# Patient Record
Sex: FEMALE | Race: BLACK OR AFRICAN AMERICAN | NOT HISPANIC OR LATINO | Employment: OTHER | URBAN - METROPOLITAN AREA
[De-identification: names, ages, dates, MRNs, and addresses within clinical notes are randomized per-mention and may not be internally consistent; named-entity substitution may affect disease eponyms.]

---

## 2017-01-05 ENCOUNTER — HISTORICAL (OUTPATIENT)
Dept: LAB | Facility: HOSPITAL | Age: 72
End: 2017-01-05

## 2017-01-06 ENCOUNTER — HISTORICAL (OUTPATIENT)
Dept: ADMINISTRATIVE | Facility: HOSPITAL | Age: 72
End: 2017-01-06

## 2017-09-01 ENCOUNTER — HISTORICAL (OUTPATIENT)
Dept: LAB | Facility: HOSPITAL | Age: 72
End: 2017-09-01

## 2017-09-01 LAB
ABS NEUT (OLG): 4.23 X10(3)/MCL (ref 2.1–9.2)
ALBUMIN SERPL-MCNC: 3.4 GM/DL (ref 3.4–5)
ALBUMIN/GLOB SERPL: 1 RATIO (ref 1.1–2)
ALP SERPL-CCNC: 88 UNIT/L (ref 38–126)
ALT SERPL-CCNC: 27 UNIT/L (ref 12–78)
AST SERPL-CCNC: 17 UNIT/L (ref 15–37)
BASOPHILS # BLD AUTO: 0.1 X10(3)/MCL (ref 0–0.2)
BASOPHILS NFR BLD AUTO: 1 %
BILIRUB SERPL-MCNC: 0.4 MG/DL (ref 0.2–1)
BILIRUBIN DIRECT+TOT PNL SERPL-MCNC: 0
BILIRUBIN DIRECT+TOT PNL SERPL-MCNC: 0.4 MG/DL (ref 0–0.8)
BUN SERPL-MCNC: 14 MG/DL (ref 7–18)
CALCIUM SERPL-MCNC: 9.5 MG/DL (ref 8.5–10.1)
CHLORIDE SERPL-SCNC: 106 MMOL/L (ref 98–107)
CHOLEST SERPL-MCNC: 185 MG/DL (ref 0–200)
CHOLEST/HDLC SERPL: 3.2 {RATIO} (ref 0–4)
CO2 SERPL-SCNC: 29 MMOL/L (ref 21–32)
CREAT SERPL-MCNC: 0.76 MG/DL (ref 0.55–1.02)
CREAT UR-MCNC: 181 MG/DL
EOSINOPHIL # BLD AUTO: 0.2 X10(3)/MCL (ref 0–0.9)
EOSINOPHIL NFR BLD AUTO: 3 %
ERYTHROCYTE [DISTWIDTH] IN BLOOD BY AUTOMATED COUNT: 13.2 % (ref 11.5–17)
EST. AVERAGE GLUCOSE BLD GHB EST-MCNC: 146 MG/DL
GLOBULIN SER-MCNC: 3.4 GM/DL (ref 2.4–3.5)
GLUCOSE SERPL-MCNC: 91 MG/DL (ref 74–106)
HBA1C MFR BLD: 6.7 % (ref 4.2–6.3)
HCT VFR BLD AUTO: 43.8 % (ref 37–47)
HDLC SERPL-MCNC: 57 MG/DL (ref 35–60)
HGB BLD-MCNC: 13.1 GM/DL (ref 12–16)
LDLC SERPL CALC-MCNC: 110 MG/DL (ref 0–129)
LYMPHOCYTES # BLD AUTO: 2 X10(3)/MCL (ref 0.6–4.6)
LYMPHOCYTES NFR BLD AUTO: 28 %
MCH RBC QN AUTO: 29.2 PG (ref 27–31)
MCHC RBC AUTO-ENTMCNC: 29.9 GM/DL (ref 33–36)
MCV RBC AUTO: 97.6 FL (ref 80–94)
MICROALBUMIN UR-MCNC: 1.4 MG/DL
MICROALBUMIN/CREAT RATIO PNL UR: 7.7 MG/GM CR (ref 0–30)
MONOCYTES # BLD AUTO: 0.5 X10(3)/MCL (ref 0.1–1.3)
MONOCYTES NFR BLD AUTO: 8 %
NEUTROPHILS # BLD AUTO: 4.23 X10(3)/MCL (ref 2.1–9.2)
NEUTROPHILS NFR BLD AUTO: 60 %
PLATELET # BLD AUTO: 254 X10(3)/MCL (ref 130–400)
PMV BLD AUTO: 10.1 FL (ref 9.4–12.4)
POTASSIUM SERPL-SCNC: 4.4 MMOL/L (ref 3.5–5.1)
PROT SERPL-MCNC: 6.8 GM/DL (ref 6.4–8.2)
RBC # BLD AUTO: 4.49 X10(6)/MCL (ref 4.2–5.4)
SODIUM SERPL-SCNC: 144 MMOL/L (ref 136–145)
TRIGL SERPL-MCNC: 89 MG/DL (ref 30–150)
VLDLC SERPL CALC-MCNC: 18 MG/DL
WBC # SPEC AUTO: 7.1 X10(3)/MCL (ref 4.5–11.5)

## 2017-10-12 ENCOUNTER — HISTORICAL (OUTPATIENT)
Dept: ADMINISTRATIVE | Facility: HOSPITAL | Age: 72
End: 2017-10-12

## 2017-10-12 LAB
ABS NEUT (OLG): 4.37 X10(3)/MCL (ref 2.1–9.2)
ALBUMIN SERPL-MCNC: 3.7 GM/DL (ref 3.4–5)
ALBUMIN/GLOB SERPL: 1 {RATIO}
ALP SERPL-CCNC: 92 UNIT/L (ref 38–126)
ALT SERPL-CCNC: 26 UNIT/L (ref 12–78)
APPEARANCE, UA: CLEAR
AST SERPL-CCNC: 14 UNIT/L (ref 15–37)
BACTERIA #/AREA URNS AUTO: NORMAL /HPF
BASOPHILS # BLD AUTO: 0 X10(3)/MCL (ref 0–0.2)
BASOPHILS NFR BLD AUTO: 1 %
BILIRUB SERPL-MCNC: 0.3 MG/DL (ref 0.2–1)
BILIRUB UR QL STRIP: NEGATIVE
BILIRUBIN DIRECT+TOT PNL SERPL-MCNC: 0.1 MG/DL (ref 0–0.2)
BILIRUBIN DIRECT+TOT PNL SERPL-MCNC: 0.2 MG/DL (ref 0–0.8)
BUN SERPL-MCNC: 19 MG/DL (ref 7–18)
CALCIUM SERPL-MCNC: 9.6 MG/DL (ref 8.5–10.1)
CHLORIDE SERPL-SCNC: 102 MMOL/L (ref 98–107)
CO2 SERPL-SCNC: 31 MMOL/L (ref 21–32)
COLOR UR: YELLOW
CREAT SERPL-MCNC: 0.78 MG/DL (ref 0.55–1.02)
EOSINOPHIL # BLD AUTO: 0.2 X10(3)/MCL (ref 0–0.9)
EOSINOPHIL NFR BLD AUTO: 3 %
ERYTHROCYTE [DISTWIDTH] IN BLOOD BY AUTOMATED COUNT: 12.9 % (ref 11.5–17)
ERYTHROCYTE [SEDIMENTATION RATE] IN BLOOD: 25 MM/HR (ref 0–20)
GLOBULIN SER-MCNC: 3.6 GM/DL (ref 2.4–3.5)
GLUCOSE (UA): NEGATIVE
GLUCOSE SERPL-MCNC: 99 MG/DL (ref 74–106)
HCT VFR BLD AUTO: 41 % (ref 37–47)
HGB BLD-MCNC: 13.4 GM/DL (ref 12–16)
HGB UR QL STRIP: NEGATIVE
KETONES UR QL STRIP: NEGATIVE
LEUKOCYTE ESTERASE UR QL STRIP: NEGATIVE
LYMPHOCYTES # BLD AUTO: 2.4 X10(3)/MCL (ref 0.6–4.6)
LYMPHOCYTES NFR BLD AUTO: 31 %
MCH RBC QN AUTO: 31.1 PG (ref 27–31)
MCHC RBC AUTO-ENTMCNC: 32.7 GM/DL (ref 33–36)
MCV RBC AUTO: 95.1 FL (ref 80–94)
MONOCYTES # BLD AUTO: 0.8 X10(3)/MCL (ref 0.1–1.3)
MONOCYTES NFR BLD AUTO: 10 %
NEUTROPHILS # BLD AUTO: 4.37 X10(3)/MCL (ref 1.4–7.9)
NEUTROPHILS NFR BLD AUTO: 56 %
NITRITE UR QL STRIP.AUTO: NEGATIVE
PH UR STRIP: 6.5 [PH] (ref 5–9)
PLATELET # BLD AUTO: 265 X10(3)/MCL (ref 130–400)
PMV BLD AUTO: 9.5 FL (ref 9.4–12.4)
POTASSIUM SERPL-SCNC: 3.9 MMOL/L (ref 3.5–5.1)
PROT SERPL-MCNC: 7.3 GM/DL (ref 6.4–8.2)
PROT UR QL STRIP: NEGATIVE
RBC # BLD AUTO: 4.31 X10(6)/MCL (ref 4.2–5.4)
RBC #/AREA URNS HPF: NORMAL /[HPF]
RHEUMATOID FACT SERPL-ACNC: 10 IU/ML (ref 0–15)
SODIUM SERPL-SCNC: 139 MMOL/L (ref 136–145)
SP GR UR STRIP: 1.02 (ref 1–1.03)
SQUAMOUS EPITHELIAL, UA: NORMAL
UROBILINOGEN UR STRIP-ACNC: 0.2
WBC # SPEC AUTO: 7.8 X10(3)/MCL (ref 4.5–11.5)
WBC #/AREA URNS AUTO: NORMAL /HPF (ref 0–3)

## 2018-02-27 ENCOUNTER — HISTORICAL (OUTPATIENT)
Dept: LAB | Facility: HOSPITAL | Age: 73
End: 2018-02-27

## 2018-02-27 LAB
ALBUMIN SERPL-MCNC: 3.6 GM/DL (ref 3.4–5)
ALBUMIN/GLOB SERPL: 0.9 RATIO (ref 1.1–2)
ALP SERPL-CCNC: 92 UNIT/L (ref 38–126)
ALT SERPL-CCNC: 32 UNIT/L (ref 12–78)
APPEARANCE, UA: CLEAR
AST SERPL-CCNC: 22 UNIT/L (ref 15–37)
BACTERIA SPEC CULT: ABNORMAL /HPF
BILIRUB SERPL-MCNC: 0.7 MG/DL (ref 0.2–1)
BILIRUB UR QL STRIP: NEGATIVE
BILIRUBIN DIRECT+TOT PNL SERPL-MCNC: 0.1 MG/DL (ref 0–0.5)
BILIRUBIN DIRECT+TOT PNL SERPL-MCNC: 0.6 MG/DL (ref 0–0.8)
BUN SERPL-MCNC: 12 MG/DL (ref 7–18)
CALCIUM SERPL-MCNC: 9.3 MG/DL (ref 8.5–10.1)
CHLORIDE SERPL-SCNC: 104 MMOL/L (ref 98–107)
CO2 SERPL-SCNC: 30 MMOL/L (ref 21–32)
COLOR UR: YELLOW
CREAT SERPL-MCNC: 0.67 MG/DL (ref 0.55–1.02)
EST. AVERAGE GLUCOSE BLD GHB EST-MCNC: 157 MG/DL
GLOBULIN SER-MCNC: 3.8 GM/DL (ref 2.4–3.5)
GLUCOSE (UA): NEGATIVE
GLUCOSE SERPL-MCNC: 123 MG/DL (ref 74–106)
HBA1C MFR BLD: 7.1 % (ref 4.2–6.3)
HGB UR QL STRIP: NEGATIVE
KETONES UR QL STRIP: NEGATIVE
LEUKOCYTE ESTERASE UR QL STRIP: NEGATIVE
NITRITE UR QL STRIP: NEGATIVE
PH UR STRIP: 8 [PH] (ref 5–9)
POTASSIUM SERPL-SCNC: 4 MMOL/L (ref 3.5–5.1)
PROT SERPL-MCNC: 7.4 GM/DL (ref 6.4–8.2)
PROT UR QL STRIP: ABNORMAL
RBC #/AREA URNS HPF: ABNORMAL /[HPF]
SODIUM SERPL-SCNC: 138 MMOL/L (ref 136–145)
SP GR UR STRIP: 1.02 (ref 1–1.03)
SQUAMOUS EPITHELIAL, UA: ABNORMAL
TSH SERPL-ACNC: 1.29 MIU/ML (ref 0.36–3.74)
UA WBC MAN: ABNORMAL
UROBILINOGEN UR STRIP-ACNC: 0.2

## 2018-03-07 ENCOUNTER — HISTORICAL (OUTPATIENT)
Dept: ENDOSCOPY | Facility: HOSPITAL | Age: 73
End: 2018-03-07

## 2018-03-08 ENCOUNTER — HISTORICAL (OUTPATIENT)
Dept: ENDOSCOPY | Facility: HOSPITAL | Age: 73
End: 2018-03-08

## 2018-04-04 ENCOUNTER — HISTORICAL (OUTPATIENT)
Dept: LAB | Facility: HOSPITAL | Age: 73
End: 2018-04-04

## 2018-07-09 ENCOUNTER — HISTORICAL (OUTPATIENT)
Dept: ADMINISTRATIVE | Facility: HOSPITAL | Age: 73
End: 2018-07-09

## 2018-07-09 LAB
ABS NEUT (OLG): 4.34 X10(3)/MCL (ref 2.1–9.2)
ALBUMIN SERPL-MCNC: 3.6 GM/DL (ref 3.4–5)
ALBUMIN/GLOB SERPL: 0.9 {RATIO}
ALP SERPL-CCNC: 90 UNIT/L (ref 38–126)
ALT SERPL-CCNC: 29 UNIT/L (ref 12–78)
AST SERPL-CCNC: 20 UNIT/L (ref 15–37)
BASOPHILS # BLD AUTO: 0.1 X10(3)/MCL (ref 0–0.2)
BASOPHILS NFR BLD AUTO: 1 %
BILIRUB SERPL-MCNC: 0.4 MG/DL (ref 0.2–1)
BILIRUBIN DIRECT+TOT PNL SERPL-MCNC: 0.1 MG/DL (ref 0–0.2)
BILIRUBIN DIRECT+TOT PNL SERPL-MCNC: 0.3 MG/DL (ref 0–0.8)
BUN SERPL-MCNC: 17 MG/DL (ref 7–18)
CALCIUM SERPL-MCNC: 9.6 MG/DL (ref 8.5–10.1)
CHLORIDE SERPL-SCNC: 101 MMOL/L (ref 98–107)
CHOLEST SERPL-MCNC: 188 MG/DL (ref 0–200)
CHOLEST/HDLC SERPL: 3.5 {RATIO} (ref 0–4)
CO2 SERPL-SCNC: 32 MMOL/L (ref 21–32)
CREAT SERPL-MCNC: 0.76 MG/DL (ref 0.55–1.02)
CREAT UR-MCNC: 145 MG/DL
DEPRECATED CALCIDIOL+CALCIFEROL SERPL-MC: 40 NG/ML (ref 30–80)
EOSINOPHIL # BLD AUTO: 0.2 X10(3)/MCL (ref 0–0.9)
EOSINOPHIL NFR BLD AUTO: 2 %
ERYTHROCYTE [DISTWIDTH] IN BLOOD BY AUTOMATED COUNT: 12.4 % (ref 11.5–17)
EST. AVERAGE GLUCOSE BLD GHB EST-MCNC: 160 MG/DL
GLOBULIN SER-MCNC: 3.8 GM/DL (ref 2.4–3.5)
GLUCOSE SERPL-MCNC: 117 MG/DL (ref 74–106)
HBA1C MFR BLD: 7.2 % (ref 4.2–6.3)
HCT VFR BLD AUTO: 42.4 % (ref 37–47)
HDLC SERPL-MCNC: 54 MG/DL (ref 35–60)
HGB BLD-MCNC: 13.4 GM/DL (ref 12–16)
LDLC SERPL CALC-MCNC: 117 MG/DL (ref 0–129)
LYMPHOCYTES # BLD AUTO: 2.1 X10(3)/MCL (ref 0.6–4.6)
LYMPHOCYTES NFR BLD AUTO: 30 %
MCH RBC QN AUTO: 30.2 PG (ref 27–31)
MCHC RBC AUTO-ENTMCNC: 31.6 GM/DL (ref 33–36)
MCV RBC AUTO: 95.5 FL (ref 80–94)
MICROALBUMIN UR-MCNC: 1.1 MG/DL
MICROALBUMIN/CREAT RATIO PNL UR: 7.6 MG/GM CR (ref 0–30)
MONOCYTES # BLD AUTO: 0.4 X10(3)/MCL (ref 0.1–1.3)
MONOCYTES NFR BLD AUTO: 6 %
NEUTROPHILS # BLD AUTO: 4.34 X10(3)/MCL (ref 2.1–9.2)
NEUTROPHILS NFR BLD AUTO: 61 %
PLATELET # BLD AUTO: 299 X10(3)/MCL (ref 130–400)
PMV BLD AUTO: 10.5 FL (ref 9.4–12.4)
POTASSIUM SERPL-SCNC: 3.8 MMOL/L (ref 3.5–5.1)
PROT SERPL-MCNC: 7.4 GM/DL (ref 6.4–8.2)
RBC # BLD AUTO: 4.44 X10(6)/MCL (ref 4.2–5.4)
SODIUM SERPL-SCNC: 141 MMOL/L (ref 136–145)
TRIGL SERPL-MCNC: 86 MG/DL (ref 30–150)
TSH SERPL-ACNC: 1.26 MIU/L (ref 0.36–3.74)
VLDLC SERPL CALC-MCNC: 17 MG/DL
WBC # SPEC AUTO: 7.2 X10(3)/MCL (ref 4.5–11.5)

## 2019-01-03 ENCOUNTER — HISTORICAL (OUTPATIENT)
Dept: LAB | Facility: HOSPITAL | Age: 74
End: 2019-01-03

## 2019-01-03 LAB
ABS NEUT (OLG): 4.38 X10(3)/MCL (ref 2.1–9.2)
ALBUMIN SERPL-MCNC: 3.5 GM/DL (ref 3.4–5)
ALBUMIN/GLOB SERPL: 1 RATIO (ref 1.1–2)
ALP SERPL-CCNC: 107 UNIT/L (ref 38–126)
ALT SERPL-CCNC: 28 UNIT/L (ref 12–78)
APPEARANCE, UA: CLEAR
AST SERPL-CCNC: 20 UNIT/L (ref 15–37)
BACTERIA SPEC CULT: ABNORMAL /HPF
BASOPHILS # BLD AUTO: 0.1 X10(3)/MCL (ref 0–0.2)
BASOPHILS NFR BLD AUTO: 1 %
BILIRUB SERPL-MCNC: 0.2 MG/DL (ref 0.2–1)
BILIRUB UR QL STRIP: NEGATIVE
BILIRUBIN DIRECT+TOT PNL SERPL-MCNC: 0.1 MG/DL (ref 0–0.5)
BILIRUBIN DIRECT+TOT PNL SERPL-MCNC: 0.1 MG/DL (ref 0–0.8)
BUN SERPL-MCNC: 17 MG/DL (ref 7–18)
CALCIUM SERPL-MCNC: 9 MG/DL (ref 8.5–10.1)
CHLORIDE SERPL-SCNC: 103 MMOL/L (ref 98–107)
CO2 SERPL-SCNC: 30 MMOL/L (ref 21–32)
COLOR UR: YELLOW
CREAT SERPL-MCNC: 0.84 MG/DL (ref 0.55–1.02)
CREAT UR-MCNC: 68 MG/DL
DEPRECATED CALCIDIOL+CALCIFEROL SERPL-MC: 38.8 NG/ML (ref 30–80)
EOSINOPHIL # BLD AUTO: 0.3 X10(3)/MCL (ref 0–0.9)
EOSINOPHIL NFR BLD AUTO: 3 %
ERYTHROCYTE [DISTWIDTH] IN BLOOD BY AUTOMATED COUNT: 12.9 % (ref 11.5–17)
EST. AVERAGE GLUCOSE BLD GHB EST-MCNC: 151 MG/DL
GLOBULIN SER-MCNC: 3.6 GM/DL (ref 2.4–3.5)
GLUCOSE (UA): NEGATIVE
GLUCOSE SERPL-MCNC: 130 MG/DL (ref 74–106)
HBA1C MFR BLD: 6.9 % (ref 4.2–6.3)
HCT VFR BLD AUTO: 43 % (ref 37–47)
HGB BLD-MCNC: 13.1 GM/DL (ref 12–16)
HGB UR QL STRIP: NEGATIVE
KETONES UR QL STRIP: ABNORMAL
LEUKOCYTE ESTERASE UR QL STRIP: ABNORMAL
LYMPHOCYTES # BLD AUTO: 3 X10(3)/MCL (ref 0.6–4.6)
LYMPHOCYTES NFR BLD AUTO: 35 %
MCH RBC QN AUTO: 29.5 PG (ref 27–31)
MCHC RBC AUTO-ENTMCNC: 30.5 GM/DL (ref 33–36)
MCV RBC AUTO: 96.8 FL (ref 80–94)
MICROALBUMIN UR-MCNC: 0.8 MG/DL
MICROALBUMIN/CREAT RATIO PNL UR: 11.8 MG/GM CR (ref 0–30)
MONOCYTES # BLD AUTO: 0.8 X10(3)/MCL (ref 0.1–1.3)
MONOCYTES NFR BLD AUTO: 10 %
NEUTROPHILS # BLD AUTO: 4.38 X10(3)/MCL (ref 2.1–9.2)
NEUTROPHILS NFR BLD AUTO: 51 %
NITRITE UR QL STRIP: NEGATIVE
PH UR STRIP: 8.5 [PH] (ref 5–9)
PLATELET # BLD AUTO: 273 X10(3)/MCL (ref 130–400)
PMV BLD AUTO: 10 FL (ref 9.4–12.4)
POTASSIUM SERPL-SCNC: 4.1 MMOL/L (ref 3.5–5.1)
PROT SERPL-MCNC: 7.1 GM/DL (ref 6.4–8.2)
PROT UR QL STRIP: NEGATIVE
RBC # BLD AUTO: 4.44 X10(6)/MCL (ref 4.2–5.4)
RBC #/AREA URNS HPF: 11 /HPF (ref 0–2)
SODIUM SERPL-SCNC: 140 MMOL/L (ref 136–145)
SP GR UR STRIP: 1.02 (ref 1–1.03)
SQUAMOUS EPITHELIAL, UA: ABNORMAL
TSH SERPL-ACNC: 2.72 MIU/L (ref 0.36–3.74)
UROBILINOGEN UR STRIP-ACNC: 0.2
VIT B12 SERPL-MCNC: 980 PG/ML (ref 193–986)
WBC # SPEC AUTO: 8.6 X10(3)/MCL (ref 4.5–11.5)
WBC #/AREA URNS HPF: ABNORMAL /[HPF]

## 2019-04-23 ENCOUNTER — HISTORICAL (OUTPATIENT)
Dept: ADMINISTRATIVE | Facility: HOSPITAL | Age: 74
End: 2019-04-23

## 2019-05-21 LAB
BUN SERPL-MCNC: 27 MG/DL (ref 7–18)
CALCIUM SERPL-MCNC: 8.9 MG/DL (ref 8.5–10.1)
CHLORIDE SERPL-SCNC: 96 MMOL/L (ref 98–107)
CO2 SERPL-SCNC: 33 MMOL/L (ref 21–32)
CREAT SERPL-MCNC: 1.21 MG/DL (ref 0.55–1.02)
CREAT/UREA NIT SERPL: 22
EST. AVERAGE GLUCOSE BLD GHB EST-MCNC: 160 MG/DL
GLUCOSE SERPL-MCNC: 167 MG/DL (ref 74–106)
HBA1C MFR BLD: 7.2 % (ref 4.2–6.3)
POTASSIUM SERPL-SCNC: 3.2 MMOL/L (ref 3.5–5.1)
SODIUM SERPL-SCNC: 137 MMOL/L (ref 136–145)

## 2019-05-30 ENCOUNTER — HISTORICAL (OUTPATIENT)
Dept: SURGERY | Facility: HOSPITAL | Age: 74
End: 2019-05-30

## 2019-06-05 ENCOUNTER — HISTORICAL (OUTPATIENT)
Dept: ADMINISTRATIVE | Facility: HOSPITAL | Age: 74
End: 2019-06-05

## 2019-06-26 ENCOUNTER — HISTORICAL (OUTPATIENT)
Dept: ADMINISTRATIVE | Facility: HOSPITAL | Age: 74
End: 2019-06-26

## 2019-07-02 ENCOUNTER — HISTORICAL (OUTPATIENT)
Dept: LAB | Facility: HOSPITAL | Age: 74
End: 2019-07-02

## 2019-07-02 LAB
ABS NEUT (OLG): 4.26 X10(3)/MCL (ref 2.1–9.2)
ALBUMIN SERPL-MCNC: 3.5 GM/DL (ref 3.4–5)
ALBUMIN/GLOB SERPL: 1.1 RATIO (ref 1.1–2)
ALP SERPL-CCNC: 74 UNIT/L (ref 38–126)
ALT SERPL-CCNC: 22 UNIT/L (ref 12–78)
AST SERPL-CCNC: 14 UNIT/L (ref 15–37)
BASOPHILS # BLD AUTO: 0 X10(3)/MCL (ref 0–0.2)
BASOPHILS NFR BLD AUTO: 1 %
BILIRUB SERPL-MCNC: 0.3 MG/DL (ref 0.2–1)
BILIRUBIN DIRECT+TOT PNL SERPL-MCNC: 0.1 MG/DL (ref 0–0.5)
BILIRUBIN DIRECT+TOT PNL SERPL-MCNC: 0.2 MG/DL (ref 0–0.8)
BUN SERPL-MCNC: 17 MG/DL (ref 7–18)
CALCIUM SERPL-MCNC: 9.6 MG/DL (ref 8.5–10.1)
CHLORIDE SERPL-SCNC: 105 MMOL/L (ref 98–107)
CHOLEST SERPL-MCNC: 161 MG/DL (ref 0–200)
CHOLEST/HDLC SERPL: 3.5 {RATIO} (ref 0–4)
CO2 SERPL-SCNC: 31 MMOL/L (ref 21–32)
CREAT SERPL-MCNC: 0.86 MG/DL (ref 0.55–1.02)
CREAT UR-MCNC: 202 MG/DL
DEPRECATED CALCIDIOL+CALCIFEROL SERPL-MC: 36 NG/ML (ref 30–80)
EOSINOPHIL # BLD AUTO: 0.2 X10(3)/MCL (ref 0–0.9)
EOSINOPHIL NFR BLD AUTO: 2 %
ERYTHROCYTE [DISTWIDTH] IN BLOOD BY AUTOMATED COUNT: 12.9 % (ref 11.5–17)
EST. AVERAGE GLUCOSE BLD GHB EST-MCNC: 148 MG/DL
GLOBULIN SER-MCNC: 3.2 GM/DL (ref 2.4–3.5)
GLUCOSE SERPL-MCNC: 138 MG/DL (ref 74–106)
HBA1C MFR BLD: 6.8 % (ref 4.2–6.3)
HCT VFR BLD AUTO: 42.8 % (ref 37–47)
HDLC SERPL-MCNC: 46 MG/DL (ref 35–60)
HGB BLD-MCNC: 13.1 GM/DL (ref 12–16)
LDLC SERPL CALC-MCNC: 94 MG/DL (ref 0–129)
LYMPHOCYTES # BLD AUTO: 2.1 X10(3)/MCL (ref 0.6–4.6)
LYMPHOCYTES NFR BLD AUTO: 30 %
MCH RBC QN AUTO: 29.6 PG (ref 27–31)
MCHC RBC AUTO-ENTMCNC: 30.6 GM/DL (ref 33–36)
MCV RBC AUTO: 96.8 FL (ref 80–94)
MICROALBUMIN UR-MCNC: 1.3 MG/DL
MICROALBUMIN/CREAT RATIO PNL UR: 6.4 MG/GM CR (ref 0–30)
MONOCYTES # BLD AUTO: 0.5 X10(3)/MCL (ref 0.1–1.3)
MONOCYTES NFR BLD AUTO: 8 %
NEUTROPHILS # BLD AUTO: 4.26 X10(3)/MCL (ref 2.1–9.2)
NEUTROPHILS NFR BLD AUTO: 60 %
PLATELET # BLD AUTO: 301 X10(3)/MCL (ref 130–400)
PMV BLD AUTO: 11.1 FL (ref 9.4–12.4)
POTASSIUM SERPL-SCNC: 4 MMOL/L (ref 3.5–5.1)
PROT SERPL-MCNC: 6.7 GM/DL (ref 6.4–8.2)
RBC # BLD AUTO: 4.42 X10(6)/MCL (ref 4.2–5.4)
SODIUM SERPL-SCNC: 141 MMOL/L (ref 136–145)
TRIGL SERPL-MCNC: 107 MG/DL (ref 30–150)
TSH SERPL-ACNC: 1.12 MIU/L (ref 0.36–3.74)
VLDLC SERPL CALC-MCNC: 21 MG/DL
WBC # SPEC AUTO: 7.1 X10(3)/MCL (ref 4.5–11.5)

## 2019-07-18 ENCOUNTER — HISTORICAL (OUTPATIENT)
Dept: ADMINISTRATIVE | Facility: HOSPITAL | Age: 74
End: 2019-07-18

## 2019-10-18 ENCOUNTER — HISTORICAL (OUTPATIENT)
Dept: ADMINISTRATIVE | Facility: HOSPITAL | Age: 74
End: 2019-10-18

## 2019-11-20 ENCOUNTER — HISTORICAL (OUTPATIENT)
Dept: ADMINISTRATIVE | Facility: HOSPITAL | Age: 74
End: 2019-11-20

## 2020-02-27 ENCOUNTER — HISTORICAL (OUTPATIENT)
Dept: RADIOLOGY | Facility: HOSPITAL | Age: 75
End: 2020-02-27

## 2020-08-03 ENCOUNTER — HISTORICAL (OUTPATIENT)
Dept: ADMINISTRATIVE | Facility: HOSPITAL | Age: 75
End: 2020-08-03

## 2020-08-03 LAB
ABS NEUT (OLG): 4.55 X10(3)/MCL (ref 2.1–9.2)
ALBUMIN SERPL-MCNC: 3.7 GM/DL (ref 3.4–4.8)
ALBUMIN/GLOB SERPL: 1.1 RATIO (ref 1.1–2)
ALP SERPL-CCNC: 82 UNIT/L (ref 40–150)
ALT SERPL-CCNC: 25 UNIT/L (ref 0–55)
AST SERPL-CCNC: 16 UNIT/L (ref 5–34)
BASOPHILS # BLD AUTO: 0.1 X10(3)/MCL (ref 0–0.2)
BASOPHILS NFR BLD AUTO: 1 %
BILIRUB SERPL-MCNC: 0.3 MG/DL
BILIRUBIN DIRECT+TOT PNL SERPL-MCNC: 0.1 MG/DL (ref 0–0.5)
BILIRUBIN DIRECT+TOT PNL SERPL-MCNC: 0.2 MG/DL (ref 0–0.8)
BUN SERPL-MCNC: 17.4 MG/DL (ref 9.8–20.1)
CALCIUM SERPL-MCNC: 9.7 MG/DL (ref 8.4–10.2)
CHLORIDE SERPL-SCNC: 98 MMOL/L (ref 98–107)
CHOLEST SERPL-MCNC: 194 MG/DL
CHOLEST/HDLC SERPL: 5 {RATIO} (ref 0–5)
CO2 SERPL-SCNC: 33 MMOL/L (ref 23–31)
CREAT SERPL-MCNC: 0.94 MG/DL (ref 0.55–1.02)
DEPRECATED CALCIDIOL+CALCIFEROL SERPL-MC: 73.7 NG/ML (ref 6.6–49.9)
EOSINOPHIL # BLD AUTO: 0.2 X10(3)/MCL (ref 0–0.9)
EOSINOPHIL NFR BLD AUTO: 2 %
ERYTHROCYTE [DISTWIDTH] IN BLOOD BY AUTOMATED COUNT: 12.3 % (ref 11.5–17)
EST. AVERAGE GLUCOSE BLD GHB EST-MCNC: 168.6 MG/DL
GLOBULIN SER-MCNC: 3.3 GM/DL (ref 2.4–3.5)
GLUCOSE SERPL-MCNC: 160 MG/DL (ref 82–115)
HBA1C MFR BLD: 7.5 %
HCT VFR BLD AUTO: 42.8 % (ref 37–47)
HDLC SERPL-MCNC: 43 MG/DL (ref 35–60)
HGB BLD-MCNC: 13 GM/DL (ref 12–16)
LDLC SERPL CALC-MCNC: 121 MG/DL (ref 50–140)
LYMPHOCYTES # BLD AUTO: 2.3 X10(3)/MCL (ref 0.6–4.6)
LYMPHOCYTES NFR BLD AUTO: 30 %
MCH RBC QN AUTO: 29.8 PG (ref 27–31)
MCHC RBC AUTO-ENTMCNC: 30.4 GM/DL (ref 33–36)
MCV RBC AUTO: 98.2 FL (ref 80–94)
MONOCYTES # BLD AUTO: 0.5 X10(3)/MCL (ref 0.1–1.3)
MONOCYTES NFR BLD AUTO: 7 %
NEUTROPHILS # BLD AUTO: 4.55 X10(3)/MCL (ref 2.1–9.2)
NEUTROPHILS NFR BLD AUTO: 60 %
PLATELET # BLD AUTO: 288 X10(3)/MCL (ref 130–400)
PMV BLD AUTO: 11 FL (ref 9.4–12.4)
POTASSIUM SERPL-SCNC: 4.5 MMOL/L (ref 3.5–5.1)
PROT SERPL-MCNC: 7 GM/DL (ref 5.8–7.6)
RBC # BLD AUTO: 4.36 X10(6)/MCL (ref 4.2–5.4)
SODIUM SERPL-SCNC: 139 MMOL/L (ref 136–145)
TRIGL SERPL-MCNC: 150 MG/DL (ref 37–140)
TSH SERPL-ACNC: 2.58 UIU/ML (ref 0.35–4.94)
VLDLC SERPL CALC-MCNC: 30 MG/DL
WBC # SPEC AUTO: 7.6 X10(3)/MCL (ref 4.5–11.5)

## 2020-08-06 ENCOUNTER — HISTORICAL (OUTPATIENT)
Dept: ADMINISTRATIVE | Facility: HOSPITAL | Age: 75
End: 2020-08-06

## 2020-08-06 LAB
CREAT UR-MCNC: 36.1 MG/DL (ref 45–106)
MICROALBUMIN UR-MCNC: <5 UG/ML
MICROALBUMIN/CREAT RATIO PNL UR: <13.9 MG/GM CR (ref 0–30)

## 2020-08-25 ENCOUNTER — HISTORICAL (OUTPATIENT)
Dept: ADMINISTRATIVE | Facility: HOSPITAL | Age: 75
End: 2020-08-25

## 2020-09-15 ENCOUNTER — HISTORICAL (OUTPATIENT)
Dept: ADMINISTRATIVE | Facility: HOSPITAL | Age: 75
End: 2020-09-15

## 2021-03-23 LAB — CRC RECOMMENDATION EXT: NORMAL

## 2021-09-08 LAB — BCS RECOMMENDATION EXT: NORMAL

## 2021-10-11 ENCOUNTER — HISTORICAL (OUTPATIENT)
Dept: ADMINISTRATIVE | Facility: HOSPITAL | Age: 76
End: 2021-10-11

## 2021-10-11 LAB
ABS NEUT (OLG): 4.38 X10(3)/MCL (ref 2.1–9.2)
ALBUMIN SERPL-MCNC: 3.5 GM/DL (ref 3.4–4.8)
ALBUMIN/GLOB SERPL: 1.1 RATIO (ref 1.1–2)
ALP SERPL-CCNC: 98 UNIT/L (ref 40–150)
ALT SERPL-CCNC: 32 UNIT/L (ref 0–55)
AST SERPL-CCNC: 22 UNIT/L (ref 5–34)
BASOPHILS # BLD AUTO: 0.1 X10(3)/MCL (ref 0–0.2)
BASOPHILS NFR BLD AUTO: 1 %
BILIRUB SERPL-MCNC: 0.3 MG/DL
BILIRUBIN DIRECT+TOT PNL SERPL-MCNC: 0.1 MG/DL (ref 0–0.5)
BILIRUBIN DIRECT+TOT PNL SERPL-MCNC: 0.2 MG/DL (ref 0–0.8)
BUN SERPL-MCNC: 16.4 MG/DL (ref 9.8–20.1)
CALCIUM SERPL-MCNC: 9.8 MG/DL (ref 8.4–10.2)
CHLORIDE SERPL-SCNC: 102 MMOL/L (ref 98–107)
CHOLEST SERPL-MCNC: 165 MG/DL
CHOLEST/HDLC SERPL: 5 {RATIO} (ref 0–5)
CO2 SERPL-SCNC: 31 MMOL/L (ref 23–31)
CREAT SERPL-MCNC: 0.99 MG/DL (ref 0.55–1.02)
CREAT UR-MCNC: 83.5 MG/DL (ref 45–106)
DEPRECATED CALCIDIOL+CALCIFEROL SERPL-MC: 44.7 NG/ML (ref 30–80)
EOSINOPHIL # BLD AUTO: 0.1 X10(3)/MCL (ref 0–0.9)
EOSINOPHIL NFR BLD AUTO: 2 %
ERYTHROCYTE [DISTWIDTH] IN BLOOD BY AUTOMATED COUNT: 12.4 % (ref 11.5–17)
EST. AVERAGE GLUCOSE BLD GHB EST-MCNC: 248.9 MG/DL
GLOBULIN SER-MCNC: 3.3 GM/DL (ref 2.4–3.5)
GLUCOSE SERPL-MCNC: 324 MG/DL (ref 82–115)
HBA1C MFR BLD: 10.3 %
HCT VFR BLD AUTO: 43 % (ref 37–47)
HDLC SERPL-MCNC: 35 MG/DL (ref 35–60)
HGB BLD-MCNC: 13.1 GM/DL (ref 12–16)
LDLC SERPL CALC-MCNC: 94 MG/DL (ref 50–140)
LYMPHOCYTES # BLD AUTO: 2.5 X10(3)/MCL (ref 0.6–4.6)
LYMPHOCYTES NFR BLD AUTO: 33 %
MCH RBC QN AUTO: 29.4 PG (ref 27–31)
MCHC RBC AUTO-ENTMCNC: 30.5 GM/DL (ref 33–36)
MCV RBC AUTO: 96.6 FL (ref 80–94)
MICROALBUMIN UR-MCNC: 6.2 UG/ML
MICROALBUMIN/CREAT RATIO PNL UR: 7.4 MG/GM CR (ref 0–30)
MONOCYTES # BLD AUTO: 0.5 X10(3)/MCL (ref 0.1–1.3)
MONOCYTES NFR BLD AUTO: 7 %
NEUTROPHILS # BLD AUTO: 4.38 X10(3)/MCL (ref 2.1–9.2)
NEUTROPHILS NFR BLD AUTO: 57 %
PLATELET # BLD AUTO: 309 X10(3)/MCL (ref 130–400)
PMV BLD AUTO: 10.9 FL (ref 9.4–12.4)
POTASSIUM SERPL-SCNC: 4.4 MMOL/L (ref 3.5–5.1)
PROT SERPL-MCNC: 6.8 GM/DL (ref 5.8–7.6)
RBC # BLD AUTO: 4.45 X10(6)/MCL (ref 4.2–5.4)
SODIUM SERPL-SCNC: 140 MMOL/L (ref 136–145)
TRIGL SERPL-MCNC: 180 MG/DL (ref 37–140)
TSH SERPL-ACNC: 1.17 UIU/ML (ref 0.35–4.94)
VLDLC SERPL CALC-MCNC: 36 MG/DL
WBC # SPEC AUTO: 7.6 X10(3)/MCL (ref 4.5–11.5)

## 2022-01-02 ENCOUNTER — NURSE TRIAGE (OUTPATIENT)
Dept: ADMINISTRATIVE | Facility: CLINIC | Age: 77
End: 2022-01-02

## 2022-01-02 NOTE — TELEPHONE ENCOUNTER
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 Wants to know when she should get tested after exposure. Reviewed CDC guidelines.     https://www.cdc.gov/media/releases/2021/s1227-isolation-quarantine-guidance.html    Reason for Disposition   General information question, no triage required and triager able to answer question    Protocols used: INFORMATION ONLY CALL - NO TRIAGE-A-

## 2022-04-10 ENCOUNTER — HISTORICAL (OUTPATIENT)
Dept: ADMINISTRATIVE | Facility: HOSPITAL | Age: 77
End: 2022-04-10
Payer: MEDICARE

## 2022-04-19 ENCOUNTER — HISTORICAL (OUTPATIENT)
Dept: ADMINISTRATIVE | Facility: HOSPITAL | Age: 77
End: 2022-04-19
Payer: MEDICARE

## 2022-04-19 LAB
EST. AVERAGE GLUCOSE BLD GHB EST-MCNC: 185.8 MG/DL
HBA1C MFR BLD: 8.1 %

## 2022-04-27 VITALS
BODY MASS INDEX: 43.28 KG/M2 | OXYGEN SATURATION: 98 % | DIASTOLIC BLOOD PRESSURE: 78 MMHG | HEIGHT: 61 IN | SYSTOLIC BLOOD PRESSURE: 132 MMHG | WEIGHT: 229.25 LBS

## 2022-04-30 NOTE — H&P
Patient:   Emily Oshea            MRN: [id number]            FIN: [id number]               Age:   72 years     Sex:  Female     :  1945   Associated Diagnoses:   None   Author:   Darion Mariee MD      Basic Information   Source of history:  Self, Medical record.       Chief Complaint   72 year old female referred for colon polyp surveillance.  She recently had egd to evaluate   GERD she was found to have some esophagitis and duodenal erosions thought to contribute her pain but no source of the black stool.  Of note she did go to the ER for pain and black stool and her blood counts were normal and her stool for blood was negative.  Her plan for colonoscopy today shows occasional constipation but no diarrhea or gross bleeding.  She does describe again the black stools.  She had a polypscreenin.  Plan for colonoscopy rectal follow      Review of Systems   Constitutional:  Negative.    Ear/Nose/Mouth/Throat:  Negative.    Respiratory:  Negative.    Cardiovascular:  Negative.    Gastrointestinal:  Constipation, History of polyps.       Health Status   Allergies:    Allergic Reactions (Selected)  Severity Not Documented  Lyrica- Fatigue.,    Allergies (1) Active Reaction  Lyrica fatigue     Current medications:  (Selected)   Inpatient Medications  Ordered  Toradol for IM: 60 mg, form: Injection, IM, Once, first dose 13 21:20:00 CST, stop date 13 21:20:00 CST, STAT  Pending Complete  Magnesium Sulfate 1gm/100ml IVPB (Premix): 1 gm, form: Infusion, IV Piggyback, q1hr, Infuse over: 1 hr, Order duration: 2 dose(s), first dose 17 13:00:00 CST, stop date 17 14:59:00 CST, [id number]  Prescriptions  Prescribed  carvedilol 12.5 mg oral tablet: See Instructions, TAKE 1 TABLET BY MOUTH TWO  TIMES DAILY, # 180 tab(s), eRx: [facility]  clopidogrel 75 mg oral tablet: See Instructions, TAKE 1 TABLET BY MOUTH  DAILY, # 90 tab(s), eRx: [facility]  dicyclomine 20 mg  "oral tablet: 20 mg = 1 tab(s), Oral, QID, # 40 tab(s), 0 Refill(s), Pharmacy: [id number]  furosemide 20 mg oral tablet: See Instructions, TAKE 1 TABLET BY MOUTH  DAILY, # 90 tab(s), eRx: [facility]  metformin 500 mg oral tablet: See Instructions, TAKE 1 TABLET BY MOUTH in am and 2 im pm, # 180 tab(s), eRx: [facility]  ranitidine 150 mg oral tablet: See Instructions, TAKE 1 TABLET BY MOUTH TWO  TIMES DAILY, # 180 tab(s), eRx: [facility]  rosuvastatin 20 mg oral tablet: See Instructions, TAKE 1 TABLET BY MOUTH AT  BEDTIME, # 90 tab(s), eRx: [facility]  Documented Medications  Documented  Alpha Lipoic: 100 mg, Oral, BID, 200mg, 0 Refill(s)  Aspir 81 oral delayed release tablet: 81 mg = 1 tab(s), Oral, Daily, # 30 tab(s), 0 Refill(s)  POTASSIUM CHLORIDE ER 20 MEQ TBCR: 20 mEq = 1 tab(s), Oral, Daily  RIVASTIGMINE TARTRATE 4.5 MG CAPS: 4.5 mg = 1 cap(s), Oral, BID  TAMSULOSIN 0.4MG CAPSULES: 0.4 mg = 1 cap(s), Oral, Daily  Vitamin B-12: 1,000 mcg, Oral, Daily, 0 Refill(s)  Vitamin D3 5000 intl units oral capsule: = 1 tab(s), Oral, Daily, 0 Refill(s)  amLODIPine 10 mg oral tablet: 10 mg = 1 tab(s), Oral, Daily, 0 Refill(s)  hydrochlorothiazide-losartan 12.5 mg-100 mg oral tablet: 1 tab(s), Oral, Daily, # 30 tab(s), 0 Refill(s)   Problem list:    All Problems  Anxiety / SNOMED CT [id number] / Confirmed  Arthritis / SNOMED CT 15VG8252-6H6S-81W9-3A9X-HGF4I551X400 / Confirmed  in knees and back. uses cane and wheelchair  Bladder incontinence / SNOMED CT 4134231798 / Confirmed  "can't empty bladder"  Blockage of coronary artery of heart / SNOMED CT 481229P6-E32L-0J46-[id number] / Confirmed  Cardiac disease / SNOMED CT 20V4M94G-416F-[id number]-Z4N06HF686H9 / Confirmed  Dr. Onofre. last visit 7/10/2013. last EKG 7/13 at [facility]  Cataracts / SNOMED CT 5814104360 / Confirmed  "early"  DDD (degenerative disc disease), cervical / SNOMED CT " 195822223 / Confirmed  Depression / SNOMED CT 611172160 / Confirmed  DIABETES MELLITUS / ICD-9- / Confirmed  Gastro-esophageal reflux disease without esophagitis / SNOMED CT 1686223425 / Confirmed  Glasses / SNOMED CT 7792016349 / Confirmed  High cholesterol / SNOMED CT [id number]-63U4-8408-[id number]-1D07Y0352S36 / Confirmed  Hypertension / SNOMED CT 77141482 / Confirmed  Hypertension / SNOMED CT PJ41C2X8--Q2V9-S7WC6OB87U86 / Confirmed  Memory loss / SNOMED CT 22331489 / Confirmed  waiting on test results from Dr Mcmullen for dementia  Neuropathy / SNOMED CT 1356737660 / Confirmed  NIDDM / SNOMED CT 635501029 / Confirmed  no meds X 3 months  Numbness and tingling in hands / SNOMED CT 918HKCC2-OSWL-3430-[id number] / Confirmed  Obesity / SNOMED CT Y8979A29-7403-8K33-F66C-X8J7491T1H1U / Confirmed  Chronic venous insufficiency / SNOMED CT 43148081 / Confirmed  Reflux / SNOMED CT QH5A3Q71-834O-3E04-T555-4C0V27836IT2 / Confirmed  Shortness of breath / SNOMED CT 702489174 / Confirmed  MCKEE in high elevation areas  Thyroid nodule / SNOMED CT 097602360 / Confirmed  Unsteady gait / SNOMED CT N2282Z86-PA61-4912-GHMK-FT5NQBWCCK21 / Confirmed  Vitamin D deficiency, unspecified / SNOMED CT 85822756 / Confirmed  Weakness generalized / SNOMED CT 6GLX6TV5-UU60-4GV8-DQ73-0238C959S2B3 / Confirmed,    Active Problems (26)  Anxiety   Arthritis   Bladder incontinence   Blockage of coronary artery of heart   Cardiac disease   Cataracts   Chronic venous insufficiency   DDD (degenerative disc disease), cervical   Depression   DIABETES MELLITUS   Gastro-esophageal reflux disease without esophagitis   Glasses   High cholesterol   Hypertension   Hypertension   Memory loss   Neuropathy   NIDDM   Numbness and tingling in hands   Obesity   Reflux   Shortness of breath   Thyroid nodule   Unsteady gait   Vitamin D deficiency, unspecified   Weakness generalized         Histories   Past Medical History:    Active  Glasses  "([id number])  Cataracts (6326685284)  Comments:  7/18/2013 CDT 23:17 CDT - Dinah HAWKINS, Saurabh Gan  "early"  Hypertension (DB10O8S7--E3I6-Y9MS8MG52J54)  High cholesterol (SN8FH0DW-83X7-6473-PY1X-8F24V3831O54)  Cardiac disease (26S1M41L-450E-6Y60-22JF-L4C82GV453P0)  Comments:  7/18/2013 CDT 23:18 CDT - Dinah HAWKINS, Saurabh Gan  Dr. Onofre. last visit 7/10/2013. last EKG 7/13 at [facility]  Blockage of coronary artery of heart (887720C4-L63V-9L72-W7J7-978RDE85983P)  Shortness of breath (729418409)  Comments:  7/18/2013 CDT 23:19 WENDY Nix RN, Saurabh Gan  MCKEE in high elevation areas  Reflux (LQ7E4T53-011Y-0D03-Z218-2Q1K19259RT7)  Obesity (L7250J20-1514-0T98-D17E-N7Q2473B8D3Q)  Bladder incontinence (0147485318)  Comments:  7/18/2013 CDT 23:20 WENDY Nix RN, Saurabh Gan  "can't empty bladder"  Arthritis (39AH1437-9T8M-88A2-7U6T-RXF3J098V865)  Comments:  7/18/2013 CDT 23:21 WENDY Nix RN, Saurabh Gan  in knees and back. uses cane and wheelchair  Unsteady gait (N1679J27-CI73-9333-IGWM-YZ0JBGVLDN56)  Weakness generalized (9PZI5GQ6-GA81-5VL9-PH69-1779A331L3I2)  Numbness and tingling in hands (926CMML0-KQCK-4258-QR72-3E0232865CEQ)  Neuropathy (9543566116)  Memory loss (75912164)  Comments:  7/18/2013 CDT 23:23 CDT - Dinah HAWKINS, Saurabh Gan  waiting on test results from Dr Mcmullen for dementia  NIDDM (162498574)  Comments:  7/25/2013 CDT 15:25 CDT - Rola HAWKINS , Aury OBRIEN  no meds X 3 months  Anxiety (SA55Z793-3D30-0E66-9405-M8648E380OK6)  Depression (854877746)  Resolved  has used steroids in last year (780771458):  Resolved.  Comments:  7/18/2013 CDT 23:24 WENDY - Dinah HAWKINS, Saurabh Gan  injection left knee 2012   Family History:    Cardiac arrest.  Brother     Procedure history:    Biopsy Gastrointestinal on 3/7/2018 at 72 Years.  Comments:  3/7/2018 09:32 - Chuckie HAWKINS, Meli BOOTH  auto-populated from documented surgical case  Esophagogastroduodenoscopy on 3/7/2018 at 72 " Years.  Comments:  3/7/2018 09:32 - Chuckie HAWKINS, Meli BOOTH  auto-populated from documented surgical case  angiogram on 11/15/2016 at 71 Years.  Injection Lumbar Epidural Steroid (., Back) on 1/9/2014 at 68 Years.  Comments:  1/14/2014 10:38 - Pelon ARANDA, Trudi Coelho  auto-populated from documented surgical case  Hysterectomy ([id number]).  Comments:  7/18/2013 23:29 - Saurabh Nix RN  1998  Carpal tunnel release (967524216).  Comments:  7/18/2013 23:30 - Saurabh Nix RN  left hand  rooster cone injections X2 right knee.   Social History        Social & Psychosocial Habits    Alcohol  07/18/2013 Risk Assessment: Denies Alcohol Use    11/05/2015  Use: Never    Employment/School  07/18/2013  Status: Retired    Highest education: High school    11/05/2015  Status: Employed    Exercise  11/05/2015  Duration (average number of minutes): 0    Home/Environment  11/05/2015  Lives with: Alone    01/05/2017  Lives with: Children    Living situation: Home with assistance    Nutrition/Health  11/05/2015  Diet description: Regular Diet    Type of diet: Regular    Sexual  11/05/2015  Sexually active: No    Substance Abuse  07/18/2013 Risk Assessment: Denies Substance Abuse    11/05/2015  Use: Never    Tobacco  01/20/2013 Risk Assessment: Denies Tobacco Use    11/05/2015  Use: Never smoker  .        Physical Examination   Respiratory:  Lungs are clear to auscultation.    Cardiovascular:  Normal rate, Regular rhythm.    Gastrointestinal:  Soft, Non-tender, Non-distended.       No qualifying data available   Measurements from flowsheet : Measurements   3/7/2018 7:00 CST        Weight Dosing             104.33 kg                             Weight Measured and Calculated in Lbs     230.01 lb                             Weight Estimated          104.33 kg                             Height/Length Dosing      162.56 cm                             Height/Length Estimated   162.56 cm                              BSA Estimated             2.17 m2                             Body Mass Index Estimated 39.48 kg/m2        Health Maintenance      Health Maintenance     Pending (in the next year)        OverDue           Aspirin Therapy for CVD Prevention due  12/21/17  and every 1  year(s)        Due            Alcohol Misuse Screening due  02/15/18  and every 1  year(s)           Tetanus Vaccine due  03/08/18  and every 10  year(s)        Refused            Influenza Vaccine due  10/02/17  and every 1  year(s)        Due In Future            Breast Cancer Screening not due until  03/28/18  and every 1  year(s)           Bone Density Screening not due until  05/26/18  and every 2  year(s)           Diabetes Maintenance-HgbA1c not due until  08/27/18  and every 6  month(s)           Hypertension Management-Education not due until  08/31/18  and every 1  year(s)           Smoking Cessation not due until  08/31/18  and every 1  year(s)           Body Mass Index Check not due until  08/31/18  and every 1  year(s)           Obesity Screening not due until  08/31/18  and every 1  year(s)           Diabetes Maintenance-Foot Exam not due until  08/31/18  and every 1  year(s)           Diabetes Maintenance-Fasting Lipid Profile not due until  09/01/18  and every 1  year(s)           Diabetes Maintenance-Microalbumin not due until  09/01/18  and every 1  year(s)           Lipid Screening not due until  09/01/18  and every 1  year(s)           Hypertension Management-Blood Pressure not due until  09/07/18  and every 6  month(s)           Diabetes Maintenance-Eye Exam not due until  11/07/18  and every 1  year(s)           Diabetes Maintenance-Serum Creatinine not due until  02/27/19  and every 1  year(s)           Diabetes Maintenance-Urine Dipstick not due until  02/27/19  and every 1  year(s)           Hypertension Management-BMP not due until  02/27/19  and every 1  year(s)           Depression Screening not due until  02/27/19  and  every 1  year(s)           Blood Pressure Screening not due until  03/07/19  and every 1  year(s)     Satisfied (in the past 1 year)        Satisfied            Blood Pressure Screening on  03/07/18.  Satisfied by Vilma Villa LPN           Body Mass Index Check on  08/31/17.  Satisfied by Adriana Inman           Breast Cancer Screening on  03/28/17.  Satisfied by Maite Gregorio           Colorectal Screening on  02/14/18.  Satisfied by Jigna Bryant           Depression Screening on  02/27/18.  Satisfied by Fay Cisneros           Diabetes Maintenance-Eye Exam on  11/07/17.  Satisfied by Eva Salamanca MD           Diabetes Maintenance-Fasting Lipid Profile on  09/01/17.  Satisfied by Emily Zayas           Diabetes Maintenance-Foot Exam on  08/31/17.  Satisfied by Michelle Segundo MD           Diabetes Maintenance-HgbA1c on  02/27/18.  Satisfied by Michelle Segundo MD           Diabetes Maintenance-Microalbumin on  09/01/17.  Satisfied by Emily Zayas           Diabetes Maintenance-Serum Creatinine on  02/27/18.  Satisfied by Ankita Byrd           Diabetes Maintenance-Urine Dipstick on  02/27/18.  Satisfied by Prabha Gregorio           Hypertension Management-Blood Pressure on  03/07/18.  Satisfied by Vilma Villa LPN           Lipid Screening on  09/01/17.  Satisfied by Emily Zayas           Obesity Screening on  08/31/17.  Satisfied by Adriana Inman           Smoking Cessation on  08/31/17.  Satisfied by Adriana Inman  Reason: Expectation Satisfied Elsewhere          Review / Management   Results review:     No qualifying data available.       Impression and Plan   Plan for colon with recs to follow.  I do not feel strongly about doing an m2a for the dark stools given her cbc is normal and her stool for blood is negative

## 2022-04-30 NOTE — OP NOTE
DATE OF SURGERY:    05/30/2019    SURGEON:  SUSAN Sylvester MD    PREOPERATIVE DIAGNOSIS:  Mallet finger, left ring finger.    POSTOPERATIVE DIAGNOSIS:  Mallet finger, left ring finger.    OPERATIVE PROCEDURE:  Mallet finger repair, left ring finger.    INDICATION FOR OPERATION:  This 73-year-old had a mallet finger that she had developed approximately 4 months ago.  The patient wanted an attempt at repair, but she understood that she may require a fusion if repair was impossible.  The risks and benefits of the proposed and alternative treatment were explained to the patient.  Questions were solicited and answered.  No assurance given.  Informed consent was obtained.    OPERATION IN DETAIL:  After appropriate operative consents were obtained, the patient was taken to the operating room, placed the operating table in supine position.  Axillary block was induced by Anesthesia without difficulty.  The skin on the left arm was prepped and draped in a sterile manner using ChloraPrep.  After exsanguination with an Esmarch bandage, a previously placed arm tourniquet was elevated.  A lazy-S incision was made over the DIP joint of the left ring finger.  Careful dissection was done.  The extensor tendon complex was in continuity, but was stretched out and had partially healed on its own, so I elected to do a secondary repair using a pants-over-vest technique.  An incision was made across the extensor tendon complex and subperiosteal dissection was done to elevate the flaps.  Next, the finger was brought out to full extension and was pinned with a 0.035 K-wire.  This entire process was watched on both AP and lateral planes on image intensification.  Next, using a full-thickness repair technique, the extensor tendon was repaired with the skin with interrupted nylon sutures.  The pin was then cut and bent.  Intraoperative x-rays with the C-arm showed full extension of the finger and good placement of the pin.   The wound was then dressed in a sterile fashion using a nonadherent dressing and then a finger splint was applied.  Sterile dressings were applied to this entire area.       The tourniquet was then deflated.  Lap count, sponge count correct x2.  Estimated blood loss was minimal.  The patient tolerated the procedure without difficulty and was transferred to the  recovery room in stable condition.        ______________________________  M. MD TING Frank/NINA  DD:  05/30/2019  Time:  12:46PM  DT:  05/30/2019  Time:  01:00PM  Job #:  [id number]

## 2022-04-30 NOTE — H&P
Patient:   Emily Oshea            MRN: [id number]            FIN: [id number]               Age:   72 years     Sex:  Female     :  1945   Associated Diagnoses:   None   Author:   Darion Mariee MD      Basic Information   Source of history:  Self, Medical record.       Chief Complaint   72-year-old patient of mine whose recently evaluated in the D EGD at [facility] feelings of near syncopal, weakness, lightheadedness, epigastric abdominal pain associated with nausea vomiting.  She also states she had been having some dark stool at that time.  Sounds like she was evaluated in the ED, abnormal CMP, cardiac enzymes and CBC in a tested positive for the flu.  She underwent to the hospital on 2 separate occasions.  She denies any hematemesis.  She does have a history of GERD and was given stomach medication in pain medications and today reports she is feeling much better.  She also states during that time she had some symptoms of bright red blood in her rectum and is also scheduled for colonoscopy tomorrow.  She has a history of polyps and was recommended for 5 year recall her last colonoscopy was in    she also had an upper endoscopy at time   She has been off her plavix since monday      Review of Systems   Constitutional:  Weakness, Fatigue, Decreased activity.    Eye:  Negative.    Respiratory:  Negative.    Cardiovascular:  Syncope.    Gastrointestinal:  Nausea, Vomiting, Heartburn.         Abdominal pain: Epigastric area, The pain is mild.       Health Status   Allergies:    Allergic Reactions (Selected)  Severity Not Documented  Lyrica- Fatigue.,    Allergies (1) Active Reaction  Lyrica fatigue     Current medications:  (Selected)   Inpatient Medications  Ordered  Toradol for IM: 60 mg, form: Injection, IM, Once, first dose 13 21:20:00 CST, stop date 13 21:20:00 CST, STAT  Pending Complete  Magnesium Sulfate 1gm/100ml IVPB (Premix): 1 gm, form: Infusion, IV Piggyback, q1hr,  Infuse over: 1 hr, Order duration: 2 dose(s), first dose 01/06/17 13:00:00 CST, stop date 01/06/17 14:59:00 CST, 212[address]  Prescriptions  Prescribed  carvedilol 12.5 mg oral tablet: See Instructions, TAKE 1 TABLET BY MOUTH TWO  TIMES DAILY, # 180 tab(s), eRx: [facility]  clopidogrel 75 mg oral tablet: See Instructions, TAKE 1 TABLET BY MOUTH  DAILY, # 90 tab(s), eRx: [facility]  dicyclomine 20 mg oral tablet: 20 mg = 1 tab(s), Oral, QID, # 40 tab(s), 0 Refill(s), Pharmacy: [facility] [id number]  furosemide 20 mg oral tablet: See Instructions, TAKE 1 TABLET BY MOUTH  DAILY, # 90 tab(s), eRx: [facility]  metformin 500 mg oral tablet: See Instructions, TAKE 1 TABLET BY MOUTH in am and 2 im pm, # 180 tab(s), eRx: [facility]  ranitidine 150 mg oral tablet: See Instructions, TAKE 1 TABLET BY MOUTH TWO  TIMES DAILY, # 180 tab(s), eRx: [facility]  rosuvastatin 20 mg oral tablet: See Instructions, TAKE 1 TABLET BY MOUTH AT  BEDTIME, # 90 tab(s), eRx: [facility]  Documented Medications  Documented  Alpha Lipoic: 100 mg, Oral, BID, 200mg, 0 Refill(s)  Aspir 81 oral delayed release tablet: 81 mg = 1 tab(s), Oral, Daily, # 30 tab(s), 0 Refill(s)  POTASSIUM CHLORIDE ER 20 MEQ TBCR: 20 mEq = 1 tab(s), Oral, Daily  RIVASTIGMINE TARTRATE 4.5 MG CAPS: 4.5 mg = 1 cap(s), Oral, BID  TAMSULOSIN 0.4MG CAPSULES: 0.4 mg = 1 cap(s), Oral, Daily  Vitamin B-12: 1,000 mcg, Oral, Daily, 0 Refill(s)  Vitamin D3 5000 intl units oral capsule: = 1 tab(s), Oral, Daily, 0 Refill(s)  amLODIPine 10 mg oral tablet: 10 mg = 1 tab(s), Oral, Daily, 0 Refill(s)  hydrochlorothiazide-losartan 12.5 mg-100 mg oral tablet: 1 tab(s), Oral, Daily, # 30 tab(s), 0 Refill(s)   Problem list:    All Problems  Anxiety / SNOMED CT [id number] / Confirmed  Arthritis / SNOMED CT [id number]-[id number]-[id number] / Confirmed  in knees and back. uses cane and wheelchair  Bladder  "incontinence / SNOMED CT 4148477617 / Confirmed  "can't empty bladder"  Blockage of coronary artery of heart / SNOMED CT 971637W9-U43A-2E03-L1I6-568GDE77508W / Confirmed  Cardiac disease / SNOMED CT 54P8C71Q-086G-7N02-77CD-A3H30JH327N3 / Confirmed  Dr. Onofre. last visit 7/10/2013. last E[facility] 7/13 at [facility]  Cataracts / SNOMED CT 1708991280 / Confirmed  "early"  DDD (degenerative disc disease), cervical / SNOMED CT 952073993 / Confirmed  Depression / SNOMED CT 195303541 / Confirmed  DIABETES MELLITUS / ICD-9- / Confirmed  Gastro-esophageal reflux disease without esophagitis / SNOMED CT 5558809580 / Confirmed  Glasses / SNOMED CT 2317324040 / Confirmed  High cholesterol / SNOMED CT WM3QI4AB-30U8-7713-NN1L-8B38F5310C50 / Confirmed  Hypertension / SNOMED CT 37157157 / Confirmed  Hypertension / SNOMED CT IH34L8F1--D8O0-H1TJ1XE22H43 / Confirmed  Memory loss / SNOMED CT 08129255 / Confirmed  waiting on test results from Dr Mcmullen for dementia  Neuropathy / SNOMED CT 9786019244 / Confirmed  NIDDM / SNOMED CT 282335630 / Confirmed  no meds X 3 months  Numbness and tingling in hands / SNOMED CT 700PNNC6-ZWNB-2064-[id number] / Confirmed  Obesity / SNOMED CT Z8488U10-7368-3E99-Q86P-S9D4181V1Z2W / Confirmed  Chronic venous insufficiency / SNOMED CT 45692133 / Confirmed  Reflux / SNOMED CT UM1P4H29-535C-5S29-Y995-6U0M80945XO0 / Confirmed  Shortness of breath / SNOMED CT 485607881 / Confirmed  MCKEE in high elevation areas  Thyroid nodule / SNOMED CT 429898155 / Confirmed  Unsteady gait / SNOMED CT J9727C98-BO05-8151-UYAB-RO5LIUJJYA53 / Confirmed  Vitamin D deficiency, unspecified / SNOMED CT 32867878 / Confirmed  Weakness generalized / SNOMED CT 8MBW1ZB2-WH16-7GE0-WD70-9415P772X3D9 / Confirmed,    Active Problems (26)  Anxiety   Arthritis   Bladder incontinence   Blockage of coronary artery of heart   Cardiac disease   Cataracts   Chronic venous insufficiency   DDD (degenerative disc disease), cervical " "  Depression   DIABETES MELLITUS   Gastro-esophageal reflux disease without esophagitis   Glasses   High cholesterol   Hypertension   Hypertension   Memory loss   Neuropathy   NIDDM   Numbness and tingling in hands   Obesity   Reflux   Shortness of breath   Thyroid nodule   Unsteady gait   Vitamin D deficiency, unspecified   Weakness generalized         Histories   Past Medical History:    Active  Glasses (9940708869)  Cataracts (7015620701)  Comments:  7/18/2013 CDT 23:17 Traci Gonzalez RNNatasha Elvia  "early"  Hypertension (YE55B7P4--C8F6-D8SW9CO39J12)  High cholesterol (VL6EL5LJ-86F7-8519-EU4X-1B82T2279L92)  Cardiac disease (63V0P70J-692L-6F17-66CJ-W8P79PR705H7)  Comments:  7/18/2013 CDT 23:18 Traci Gonzalez RNNatasha Malhotralindsey Onofre. last visit 7/10/2013. last EKG 7/13 at [facility]  Blockage of coronary artery of heart (886345A1-M68J-5L21-W6M3-359JTT78384J)  Shortness of breath (200231425)  Comments:  7/18/2013 CDT 23:19 Saurabh Gonzalez RN  MCKEE in high elevation areas  Reflux (EZ3N1U36-603N-6X00-K089-4X3Q78304XT9)  Obesity (X8254H47-9025-2Q49-O24M-R7L6946J4U8M)  Bladder incontinence (0376271911)  Comments:  7/18/2013 CDT 23:20 Traci Gonzalez RNNatasha Elvia  "can't empty bladder"  Arthritis (55AK5314-2J2J-64Z9-4N1Z-NDT0C958U915)  Comments:  7/18/2013 CDT 23:21 WENDY Nix RN Saurabh Elvia  in knees and back. uses cane and wheelchair  Unsteady gait (C3549P75-PQ48-9043-TLZE-QW3IQVOWDV61)  Weakness generalized (1EMS2MG6-CU21-8UG3-LN69-2560A425S3L9)  Numbness and tingling in hands (668RFYT1-RCCS-6468-ES39-7X1710965JMZ)  Neuropathy (6130823539)  Memory loss (77508347)  Comments:  7/18/2013 CDT 23:23 CDT - Saurabh Nix RN  waiting on test results from Dr Mcmullen for dementia  NIDDM (751355659)  Comments:  7/25/2013 CDT 15:25 CDT - Aury Canela RN  no meds X 3 months  Anxiety (GI48Y543-2W08-6W30-0107-I7756S445EK2)  Depression (719586105)  Resolved  has used " steroids in last year (701244080):  Resolved.  Comments:  7/18/2013 CDT 23:24 CDT - Dinah HAWKINS, Saurabh Gan  injection left knee 2012   Family History:    Cardiac arrest.  Brother     Procedure history:    angiogram on 11/15/2016 at 71 Years.  Injection Lumbar Epidural Steroid (., Back) on 1/9/2014 at 68 Years.  Comments:  1/14/2014 10:38 - Pelon ARANDA, Trudi Coelho  auto-populated from documented surgical case  Hysterectomy (086688181).  Comments:  7/18/2013 23:29 - Saurabh Nix RN  1998  Carpal tunnel release (056981432).  Comments:  7/18/2013 23:30 - Saurabh Nix RN  left hand  rooster cone injections X2 right knee.   Social History        Social & Psychosocial Habits    Alcohol  07/18/2013 Risk Assessment: Denies Alcohol Use    11/05/2015  Use: Never    Employment/School  07/18/2013  Status: Retired    Highest education: High school    11/05/2015  Status: Employed    Exercise  11/05/2015  Duration (average number of minutes): 0    Home/Environment  11/05/2015  Lives with: Alone    01/05/2017  Lives with: Children    Living situation: Home with assistance    Nutrition/Health  11/05/2015  Diet description: Regular Diet    Type of diet: Regular    Sexual  11/05/2015  Sexually active: No    Substance Abuse  07/18/2013 Risk Assessment: Denies Substance Abuse    11/05/2015  Use: Never    Tobacco  01/20/2013 Risk Assessment: Denies Tobacco Use    11/05/2015  Use: Never smoker  .        Physical Examination   HENT:  Normocephalic.    Neck:  Supple.    Respiratory:  Lungs are clear to auscultation.    Cardiovascular:  Normal rate.    Gastrointestinal:  Soft, Non-tender, Non-distended.       No qualifying data available      Health Maintenance      Health Maintenance     Pending (in the next year)        OverDue           Aspirin Therapy for CVD Prevention due  12/21/17  and every 1  year(s)        Due            Alcohol Misuse Screening due  02/15/18  and every 1  year(s)            Tetanus Vaccine due  03/07/18  and every 10  year(s)        Refused            Influenza Vaccine due  10/02/17  and every 1  year(s)        Due In Future            Breast Cancer Screening not due until  03/28/18  and every 1  year(s)           Bone Density Screening not due until  05/26/18  and every 2  year(s)           Diabetes Maintenance-HgbA1c not due until  08/27/18  and every 6  month(s)           Hypertension Management-Blood Pressure not due until  08/27/18  and every 6  month(s)           Hypertension Management-Education not due until  08/31/18  and every 1  year(s)           Smoking Cessation not due until  08/31/18  and every 1  year(s)           Body Mass Index Check not due until  08/31/18  and every 1  year(s)           Obesity Screening not due until  08/31/18  and every 1  year(s)           Diabetes Maintenance-Foot Exam not due until  08/31/18  and every 1  year(s)           Diabetes Maintenance-Fasting Lipid Profile not due until  09/01/18  and every 1  year(s)           Diabetes Maintenance-Microalbumin not due until  09/01/18  and every 1  year(s)           Lipid Screening not due until  09/01/18  and every 1  year(s)           Diabetes Maintenance-Eye Exam not due until  11/07/18  and every 1  year(s)           Diabetes Maintenance-Serum Creatinine not due until  02/27/19  and every 1  year(s)           Diabetes Maintenance-Urine Dipstick not due until  02/27/19  and every 1  year(s)           Hypertension Management-BMP not due until  02/27/19  and every 1  year(s)           Blood Pressure Screening not due until  02/27/19  and every 1  year(s)           Depression Screening not due until  02/27/19  and every 1  year(s)     Satisfied (in the past 1 year)        Satisfied            Blood Pressure Screening on  02/27/18.  Satisfied by Fay Cisneros           Body Mass Index Check on  08/31/17.  Satisfied by Adriana Inman           Breast Cancer Screening on  03/28/17.  Satisfied by  Maite Gregorio.           Colorectal Screening on  02/14/18.  Satisfied by Jigna Bryant           Depression Screening on  02/27/18.  Satisfied by Fay Cisneros           Diabetes Maintenance-Eye Exam on  11/07/17.  Satisfied by Eva Salamanca MD           Diabetes Maintenance-Fasting Lipid Profile on  09/01/17.  Satisfied by Emily Zayas           Diabetes Maintenance-Foot Exam on  08/31/17.  Satisfied by Michelle Segundo MD           Diabetes Maintenance-HgbA1c on  02/27/18.  Satisfied by Michelle Segundo MD           Diabetes Maintenance-Microalbumin on  09/01/17.  Satisfied by Emily Zayas           Diabetes Maintenance-Serum Creatinine on  02/27/18.  Satisfied by Ankita Byrd           Diabetes Maintenance-Urine Dipstick on  02/27/18.  Satisfied by Prabha Gregorio           Hypertension Management-Blood Pressure on  02/27/18.  Satisfied by Fay Cisneros           Lipid Screening on  09/01/17.  Satisfied by Emily Zayas           Obesity Screening on  08/31/17.  Satisfied by Adriana Inman           Smoking Cessation on  08/31/17.  Satisfied by Adriana Inman  Reason: Expectation Satisfied Elsewhere          Review / Management   Results review:     No qualifying data available.       Impression and Plan   plan for egd with colon tomorrow

## 2022-05-04 NOTE — HISTORICAL [facility]ER
This is a historical note converted from [facility]. Formatting and pictures may have been removed.  Please reference Te[facility]ita for original formatting and attached multimedia. Chief Complaint  NECK AND RIGHT SHOULDER PAIN REFERRAL FROM DR. FLORENTIN ACOSTA. ?SH ESTATES SHE HAS HAD THE PAIN FOR SEVERAL WEEKS.  History of Present Illness  She is complaining of right-sided neck pain and left-sided?left shoulder pain. ?She has had the neck pain for years. ?It is just getting worse. ?She notes that she has a stiffness in her neck whenever she turns from side to side she will have pain on the right side of her neck. ?She does have some numbness and tingling?in the thumb index and long finger of the right hand.? She has a history of bilateral carpal tunnel but had surgery on the left and nothing on the right.? She notes no weakness in her arms. ?Just pain in her neck.  ?  She works as a  and she is having left-sided shoulder pain. ?She is unable to reach around behind her self or wash the left side of her hair.? She has had?tendinitis before in that shoulder?but she never did anything for it.  Review of Systems  Comprehensive Review of Systems performed with no exceptions other than as noted in HPI.  ?  ?  Physical Exam  Vitals & Measurements  T:?97.9? ?F (Oral)? BP:?133/79?  HT:?154?cm? WT:?104?kg? BMI:?43.85?  Examination of the cervical spine. ?She is tender to palpation over the right paracervical muscles and into the?right trapezius and down to the spine of the?scapula on the right. ?She has full flexion extension but very limited lateral bending and/or rotation. ?Biceps, triceps,?brachioradialis reflexes are +2 and symmetrical.? Shoulder girdle,?biceps, triceps, wrist extension flexor strength normal and symmetrical. ?Radial pulses are +2 and symmetrical.? Tinel signs are negative at the wrist and the elbow. ?Sensation is equal to light touch in both upper extremities.  ?  Examination of the left shoulder she is tender  over the greater tuberosity.? She has?full forward flexion and abduction actively but with pain. ?She has?normal external rotation?in neutral but?only gets her?left hand?to her waist?but not even behind her self on internal rotation. ?She has a positive Neer test she has a positive empty can test. ?She has tenderness over the biceps tendon. ?Sulcus sign and OBriens test are negative.? Neurovascularly she is intact  ?  Examination of the right shoulder is normal.  Assessment/Plan  1.?Cervical spondylosis?M47.812  ?We will try her on Mobic?7.5 mg twice a day. ?I recommended injecting her shoulder but she would rather wait and try the Mobic. ?I will see her back in 3 weeks if there is no improvement. ?She will obtain a cock-up wrist splint for her right wrist.  Ordered:  meloxicam, 7.5 mg = 1 tab(s), Oral, BID, # 60 tab(s), 3 Refill(s), Pharmacy: [email or web address] #[id number]  Clinic Follow up, *Est. 12/11/19 13:15:00 CST, Order for future visit, Cervical pain, [facility]  Office/Outpatient Visit Level 4 Established [id number] PC, Cervical spondylosis  Degenerative disc disease, cervical  Cervical radiculopathy  Carpal tunnel syndrome, right  Cervical pain, [facility], 11/20/19 13:26:00 CST  ?  2.?Degenerative disc disease, cervical?M50.30  Ordered:  meloxicam, 7.5 mg = 1 tab(s), Oral, BID, # 60 tab(s), 3 Refill(s), Pharmacy: [email or web address] #[id number]  Clinic Follow up, *Est. 12/11/19 13:15:00 CST, Order for future visit, Cervical pain, [facility]  Office/Outpatient Visit Level 4 Established [id number] PC, Cervical spondylosis  Degenerative disc disease, cervical  Cervical radiculopathy  Carpal tunnel syndrome, right  Cervical pain, [facility], 11/20/19 13:26:00 CST  ?  3.?Cervical radiculopathy?M54.12  Ordered:  meloxicam, 7.5 mg = 1 tab(s), Oral, BID, # 60 tab(s), 3 Refill(s), Pharmacy: [facility] #[id number]  Clinic Follow up, *Est. 12/11/19 13:15:00 CST, Order for future visit, Cervical pain,  [facility]  Office/Outpatient Visit Level 4 Established [id number] PC, Cervical spondylosis  Degenerative disc disease, cervical  Cervical radiculopathy  Carpal tunnel syndrome, right  Cervical pain, [facility], 11/20/19 13:26:00 CST  ?  4.?Carpal tunnel syndrome, right?G56.01  Ordered:  meloxicam, 7.5 mg = 1 tab(s), Oral, BID, # 60 tab(s), 3 Refill(s), Pharmacy: [facility] #[id number]  Clinic Follow up, *Est. 12/11/19 13:15:00 CST, Order for future visit, Cervical pain, [facility]  Office/Outpatient Visit Level 4 Established [id number] PC, Cervical spondylosis  Degenerative disc disease, cervical  Cervical radiculopathy  Carpal tunnel syndrome, right  Cervical pain, [facility], 11/20/19 13:26:00 CST  ?  5.?Calcific tendinitis of left shoulder?M75.32  ?  Orders:  XR Spine Cervical 2 or 3 Views, Routine, 11/20/19 13:03:00 CST, Pain, None, Ambulatory, Patient Has IV?, Rad Type, Cervical pain, Not Scheduled, 11/20/19 13:03:00 CST  Referrals  Clinic Follow up, *Est. 12/11/19 13:15:00 CST, Order for future visit, Cervical pain, [facility]   Problem List/Past Medical History  Ongoing  Anxiety  Arthritis  Bladder incontinence  Blockage of coronary artery of heart  Cardiac disease  Carpal tunnel syndrome, right  Cataracts  Cervical radiculopathy  Cervical spondylosis  Chronic venous insufficiency  BECCA - Insertion of stent into common iliac artery  DDD (degenerative disc disease), cervical  Depression  DIABETES MELLITUS  Gastro-esophageal reflux disease without esophagitis  Glasses  High cholesterol  Hypertension  Mallet deformity of left ring finger  Memory loss  Neuropathy  Numbness and tingling in hands  Obesity  Orthopedic aftercare  Primary osteoarthritis, left hand  Reflux  Rotator cuff tendinitis  Thyroid nodule  Unsteady gait  Vitamin D deficiency, unspecified  Historical  has used steroids in last year  NIDDM  Obstructive sleep apnea  Shortness of breath  Weakness  generalized  Procedure/Surgical History  Repair Left Hand Tendon, Open Approach (05/30/2019)  Repair of extensor tendon, distal insertion, primary or secondary; without graft (eg, mallet finger) (05/30/2019)  Tendon Repair (Left) (05/30/2019)  Colonoscopy (03/08/2018)  Colonoscopy, flexible; with removal of tumor(s), polyp(s), or other lesion(s) by snare technique (03/08/2018)  Excision of Ascending Colon, Via Natural or Artificial Opening Endoscopic, Diagnostic (03/08/2018)  Excision of Descending Colon, Via Natural or Artificial Opening Endoscopic, Diagnostic (03/08/2018)  Polypectomy (03/08/2018)  Biopsy Gastrointestinal (03/07/2018)  Esophagogastroduodenoscopy (03/07/2018)  Esophagogastroduodenoscopy, flexible, transoral; with biopsy, single or multiple (03/07/2018)  Excision of Duodenum, Via Natural or Artificial Opening Endoscopic, Diagnostic (03/07/2018)  Excision of Stomach, Pylorus, Via Natural or Artificial Opening Endoscopic, Diagnostic (03/07/2018)  Dilation of Right Common Iliac Artery with Two Intraluminal Devices, Percutaneous Approach (01/06/2017)  Intravascular ultrasound (noncoronary vessel) during diagnostic evaluation and/or therapeutic intervention, including radiological supervision and interpretation; initial noncoronary vessel (List separately in addition to code for primary procedure) (01/06/2017)  Transcatheter placement of an intravascular stent(s), open or percutaneous, including radiological supervision and interpretation and including angioplasty within the same vessel, when performed; initial vein (01/06/2017)  angiogram (11/15/2016)  Injection Lumbar Epidural Steroid (., Back) (01/09/2014)  Injection of anesthetic into spinal canal for analgesia (01/09/2014)  Injection of other agent into spinal canal (01/09/2014)  Injection of steroid (01/09/2014)  Injection(s), anesthetic agent and/or steroid, transforaminal epidural, with imaging guidance (fluoroscopy or CT); lumbar or sacral,  single level. (01/09/2014)  Other x-ray of lumbosacral spine (01/09/2014)  Closed [transurethral] biopsy of bladder (07/25/2013)  Cystourethroscopy, with biopsy(s). (07/25/2013)  Carpal tunnel release  Hysterectomy  rooster cone injections X2 right knee   Medications  Aspir 81 oral delayed release tablet, 81 mg= 1 tab(s), Oral, Daily  carvedilol 25 mg oral tablet, 25 mg= 1 tab(s), Oral, BID  doxycycline monohydrate 100 mg oral capsule, 100 mg= 1 cap(s), Oral, BID  ibuprofen 800 mg oral tablet, 800 mg= 1 tab(s), Oral, QID  losartan 100 mg oral tablet, 100 mg= 1 tab(s), Oral, Daily  magnesium oxide 400 mg (240 mg elemental magnesium) oral tablet, 400 mg= 1 tab(s), Oral, Daily  metformin 850 mg oral tablet, See Instructions, 1 refills  methocarbamol 750 mg oral tablet, 750 mg= 1 tab(s), Oral, TID  Mobic 7.5 mg oral tablet, 7.5 mg= 1 tab(s), Oral, BID, 3 refills  naproxen 500 mg oral tablet, 500 mg= 1 tab(s), Oral, BID,? ?Not Taking, Completed Rx: Last Dose Date/Time Unknown  omeprazole 40 mg oral DR capsule, 40 mg= 1 cap(s), Oral, Daily, 3 refills  POTASSIUM CHLORIDE ER 20 MEQ TBCR, 20 mEq= 1 tab(s), Oral, Daily  rivastigmine 6 mg oral capsule, 6 mg= 1 cap(s), Oral, BID  rosuvastatin 20 mg oral tablet, See Instructions, 1 refills  tamsulosin 0.4 mg oral capsule, 0.4 mg= 1 cap(s), Oral, Daily, 5 refills  torsemide 20 mg oral tablet  Vitamin D3 5000 intl units oral capsule, 1 tab(s), Oral, Daily  Allergies  Lyrica?(fatigue)  Social History  Abuse/Neglect  No, No, Yes, 11/20/2019  Alcohol - Denies Alcohol Use, 07/18/2013  Never, 11/05/2015  Employment/School  Employed, 11/05/2015  Retired, Highest education level: High school., 07/18/2013  Exercise  Exercise duration: 0., 11/05/2015  Home/Environment  Lives with Children. Living situation: Home with assistance., 01/05/2017  Lives with Alone., 11/05/2015  Nutrition/Health  Type of diet: Regular Diet. Regular, 11/05/2015  Sexual  Sexually active: No.,  11/05/2015  Spiritual/Cultural  None, 07/02/2019  Substance Use - Denies Substance Abuse, 07/18/2013  Never, 11/05/2015  Tobacco - Denies Tobacco Use, 01/20/2013  Never (less than 100 in lifetime), N/A, Household tobacco concerns: No., 11/20/2019  Family History  Cardiac arrest.: Brother.  Immunizations  Vaccine Date Status   influenza virus vaccine, inactivated 11/05/2015 Given   pneumococcal 13-valent conjugate vaccine 11/05/2015 Given   Health Maintenance  Health Maintenance  ???Pending?(in the next year)  ??? ??OverDue  ??? ? ? ?Coronary Artery Disease Maintenance-Antiplatelet Agent Prescribed due??and every?  ??? ? ? ?Coronary Artery Disease Maintenance-Lipid Lowering Therapy due??and every?  ??? ? ? ?Pneumococcal Vaccine due??and every?  ??? ? ? ?Bone Density Screening due??05/26/18??and every 2??year(s)  ??? ? ? ?Advance Directive due??01/01/19??and every 1??year(s)  ??? ? ? ?Cognitive Screening due??01/01/19??and every 1??year(s)  ??? ? ? ?Geriatric Depression Screening due??01/01/19??and every 1??year(s)  ??? ??Due?  ??? ? ? ?ADL Screening due??11/20/19??and every 1??year(s)  ??? ? ? ?Tetanus Vaccine due??11/20/19??and every 10??year(s)  ??? ??Due In Future?  ??? ? ? ?Alcohol Misuse Screening not due until??01/01/20??and every 1??year(s)  ??? ? ? ?Fall Risk Assessment not due until??01/01/20??and every 1??year(s)  ??? ? ? ?Functional Assessment not due until??01/01/20??and every 1??year(s)  ??? ? ? ?Obesity Screening not due until??01/01/20??and every 1??year(s)  ??? ? ? ?Diabetes Maintenance-Foot Exam not due until??01/03/20??and every 1??year(s)  ??? ? ? ?Diabetes Maintenance-Eye Exam not due until??02/19/20??and every 1??year(s)  ??? ? ? ?Diabetes Maintenance-Fasting Lipid Profile not due until??07/01/20??and every 1??year(s)  ??? ? ? ?Hypertension Management-BMP not due until??07/01/20??and every 1??year(s)  ??? ? ? ?Diabetes Maintenance-HgbA1c not due until??11/04/20??and every 1??year(s)  ??? ? ?  ?Aspirin Therapy for CVD Prevention not due until??11/05/20??and every 1??year(s)  ??? ? ? ?Hypertension Management-Blood Pressure not due until??11/19/20??and every 1??year(s)  ???Satisfied?(in the past 1 year)  ??? ??Satisfied?  ??? ? ? ?Alcohol Misuse Screening on??07/02/19.??Satisfied by Pearl Godoy LPN  ??? ? ? ?Aspirin Therapy for CVD Prevention on??11/05/19.??Satisfied by Pearl oGdoy LPN  ??? ? ? ?Blood Pressure Screening on??11/20/19.??Satisfied by Benita Lozano  ??? ? ? ?Body Mass Index Check on??11/20/19.??Satisfied by Benita Lozano  ??? ? ? ?Breast Cancer Screening ([facility]) on??04/24/19.??Satisfied by Michelle Segundo MD  ??? ? ? ?Coronary Artery Disease Maintenance-Lipid Lowering Therapy on??12/27/18.??Satisfied by Michelle Segundo MD  ??? ? ? ?Depression Screening on??07/02/19.??Satisfied by Pearl Godoy LPN  ??? ? ? ?Diabetes Maintenance-HgbA1c on??07/02/19.??Satisfied by BILLY TAYLOR  ??? ? ? ?Diabetes Screening on??07/02/19.??Satisfied by BILLY TAYLOR  ??? ? ? ?Fall Risk Assessment on??08/20/19.??Satisfied by Kristina Craig RN  ??? ? ? ?Functional Assessment on??05/21/19.??Satisfied by Bernice Correa  ??? ? ? ?Hypertension Management-Blood Pressure on??11/20/19.??Satisfied by Benita Lozano  ??? ? ? ?Lipid Screening on??07/02/19.??Satisfied by BILLY TAYLOR  ??? ? ? ?Obesity Screening on??11/20/19.??Satisfied by Benita Lozano  ?  Diagnostic Results  3 views of the cervical spine?there is significant spondylosis at C4-5 worse at C5-6 and C6-7.  ?  3 views of the left shoulder there is obvious calcific?formations in the supraspinatus tendon.? With mild acromioclavicular joint arthrosis.

## 2022-05-04 NOTE — HISTORICAL [facility] CERNER
This is a historical note converted from [employer]. Formatting and pictures may have been removed.  Please reference [employer] for original formatting and attached multimedia. Chief Complaint  itching all over with rashing, L shoulder pain  History of Present Illness  74 y/o BF presents to clinic c/o left shoulder pain.? Fell 6-7 weeks ago but had pain on right shoulder, did PT and was fine.? Then began with left shoulder pain 3-4 days ago.? Felt as though shoulder came out of socket when attempted to raise arm.? Has pulling sensation from socket when tries to reach back with arm.  Review of Systems  General: negative except as stated in hpi  Skin: negative except as stated in hpi  HEENT: negative except as stated in hpi  Respiratory: negative except as stated in hpi  CV: negative except as stated in hpi  GI: negative except as stated in hpi  : negative except as stated in hpi  MS: negative except as stated in hpi  Neuro: negative except as stated in hpi  Hematologic: negative except as stated in hpi  Endocrine: negative except as stated in hpi  Psych: negative except as stated in hpi  All other systems reviewed as negative  Physical Exam  Vitals & Measurements  T:?36.7? ?C (Oral)? HR:?73(Peripheral)? BP:?156/85?  HT:?154?cm? WT:?106?kg? BMI:?44.7?  General: no distress noted, AAOx3, afebrile  Head: normocephalic, atraumatic  Resp: CTAB, no wheezing/rhonchi/stridor/rales  CV: S1, S2; no m/r/g; peripheral pulses 2+; no peripheral edema  MS:?L?shoulder is?without?obvious asymmetry or deformity when compared to the?R?shoulder.? No surface trauma,?ecchymosis, crepitus.? No bony deformity or prominence of the humeral head.? No erythema, warmth, swelling.? NT to palpation over the clavicle, A to C joint, acromion, scapula, or humeral head.? NT to palpation?of the bicipital groove or soft tissues.? NT to palpation of the?muscles of the sternocleidomastoid, pectorals, biceps/triceps, deltoid, trapezius, rhomboid, latissimus  dorsi,?rotator cuff.??Some pain or limitation with active or passive abduction/adduction, internal/external rotation,?flexion/extension.??No axillary tenderness or lymphadenopathy.? Normal sensation over the deltoid? and ability to flex arm at elbow indicates intact axillary nerve function.??Distal motor and neurovascular status is intact.?  ?   Special Tests:  Empty Can:?positive  Drop arm:?negative  ?  Assessment/Plan  1.?Left shoulder pain?M25.512,?Left shoulder pain?M25.512  X-ray of shoulder performed: no acute displaced fracture read by night radiologist. Degenerative change of left acromioclavicular joint with osteophyte formation.  RICE  Naproxen 500mg po BID  Tylenol arthritis as needed for pain  Shoulder exercises  Consider follow up with orthopedist if pain continues to be bothersome.  Ordered:  naproxen, 500 mg = 1 tab(s), Oral, BID, # 60 tab(s), 0 Refill(s), Pharmacy: [facility] Recorded Future #[id number]  XR Shoulder Left Minimum 2 Views, Stat, 10/18/19 21:24:00 CDT, Trauma, R/O Fx, None, Ambulatory, Rad Type, Left shoulder pain, Not Scheduled, 10/18/19 21:24:00 CDT  ?  2.?Hypertension?I10  DASH diet  Continue current med regimen  FU with PCP as needed  ?   Problem List/Past Medical History  Ongoing  Anxiety  Arthritis  Bladder incontinence  Blockage of coronary artery of heart  Cardiac disease  Cataracts  Chronic venous insufficiency  BECCA - Insertion of stent into common iliac artery  DDD (degenerative disc disease), cervical  Depression  DIABETES MELLITUS  Gastro-esophageal reflux disease without esophagitis  Glasses  High cholesterol  Hypertension  Mallet deformity of left ring finger  Memory loss  Neuropathy  Numbness and tingling in hands  Obesity  Orthopedic aftercare  Primary osteoarthritis, left hand  Reflux  Thyroid nodule  Unsteady gait  Vitamin D deficiency, unspecified  Historical  has used steroids in last year  NIDDM  Obstructive sleep apnea  Shortness of breath  Weakness  generalized  Procedure/Surgical History  Repair Left Hand Tendon, Open Approach (05/30/2019)  Repair of extensor tendon, distal insertion, primary or secondary; without graft (eg, mallet finger) (05/30/2019)  Tendon Repair (Left) (05/30/2019)  Colonoscopy (03/08/2018)  Colonoscopy, flexible; with removal of tumor(s), polyp(s), or other lesion(s) by snare technique (03/08/2018)  Excision of Ascending Colon, Via Natural or Artificial Opening Endoscopic, Diagnostic (03/08/2018)  Excision of Descending Colon, Via Natural or Artificial Opening Endoscopic, Diagnostic (03/08/2018)  Polypectomy (03/08/2018)  Biopsy Gastrointestinal (03/07/2018)  Esophagogastroduodenoscopy (03/07/2018)  Esophagogastroduodenoscopy, flexible, transoral; with biopsy, single or multiple (03/07/2018)  Excision of Duodenum, Via Natural or Artificial Opening Endoscopic, Diagnostic (03/07/2018)  Excision of Stomach, Pylorus, Via Natural or Artificial Opening Endoscopic, Diagnostic (03/07/2018)  Dilation of Right Common Iliac Artery with Two Intraluminal Devices, Percutaneous Approach (01/06/2017)  Intravascular ultrasound (noncoronary vessel) during diagnostic evaluation and/or therapeutic intervention, including radiological supervision and interpretation; initial noncoronary vessel (List separately in addition to code for primary procedure) (01/06/2017)  Transcatheter placement of an intravascular stent(s), open or percutaneous, including radiological supervision and interpretation and including angioplasty within the same vessel, when performed; initial vein (01/06/2017)  angiogram (11/15/2016)  Injection Lumbar Epidural Steroid (., Back) (01/09/2014)  Injection of anesthetic into spinal canal for analgesia (01/09/2014)  Injection of other agent into spinal canal (01/09/2014)  Injection of steroid (01/09/2014)  Injection(s), anesthetic agent and/or steroid, transforaminal epidural, with imaging guidance (fluoroscopy or CT); lumbar or sacral,  single level. (01/09/2014)  Other x-ray of lumbosacral spine (01/09/2014)  Closed [transurethral] biopsy of bladder (07/25/2013)  Cystourethroscopy, with biopsy(s). (07/25/2013)  Carpal tunnel release  Hysterectomy  rooster cone injections X2 right knee   Medications  Aspir 81 oral delayed release tablet, 81 mg= 1 tab(s), Oral, Daily  carvedilol 25 mg oral tablet, 25 mg= 1 tab(s), Oral, BID  hydrochlorothiazide-losartan 12.5 mg-100 mg oral tablet, 1 tab(s), Oral, Daily,? ?Not taking  losartan 100 mg oral tablet, 100 mg= 1 tab(s), Oral, Daily  magnesium oxide 400 mg (240 mg elemental magnesium) oral tablet, 400 mg= 1 tab(s), Oral, Daily  metformin 850 mg oral tablet, See Instructions, 1 refills  naproxen 500 mg oral tablet, 500 mg= 1 tab(s), Oral, BID  omeprazole 40 mg oral DR capsule, 40 mg= 1 cap(s), Oral, Daily, 3 refills  POTASSIUM CHLORIDE ER 20 MEQ TBCR, 20 mEq= 1 tab(s), Oral, Daily  rivastigmine 6 mg oral capsule, 6 mg= 1 cap(s), Oral, BID  rosuvastatin 20 mg oral tablet, See Instructions, 1 refills  SUCRALFATE 1 GM TABS, 1 gm= 1 tab(s), Oral, QID  tamsulosin 0.4 mg oral capsule, 0.4 mg= 1 cap(s), Oral, Daily, 5 refills  torsemide 20 mg oral tablet  Vitamin D3 5000 intl units oral capsule, 1 tab(s), Oral, Daily  Allergies  Lyrica?(fatigue)  Social History  Abuse/Neglect  No, 08/20/2019  No, 08/15/2019  Alcohol - Denies Alcohol Use, 07/18/2013  Never, 11/05/2015  Employment/School  Employed, 11/05/2015  Retired, Highest education level: High school., 07/18/2013  Exercise  Exercise duration: 0., 11/05/2015  Home/Environment  Lives with Children. Living situation: Home with assistance., 01/05/2017  Lives with Alone., 11/05/2015  Nutrition/Health  Type of diet: Regular Diet. Regular, 11/05/2015  Sexual  Sexually active: No., 11/05/2015  Spiritual/Cultural  None, 07/02/2019  Substance Use - Denies Substance Abuse, 07/18/2013  Never, 11/05/2015  Tobacco - Denies Tobacco Use, 01/20/2013  Never (less than 100 in  lifetime), No, 10/18/2019  Never (less than 100 in lifetime), N/A, 08/20/2019  Family History  Cardiac arrest.: Brother.  Immunizations  Vaccine Date Status   influenza virus vaccine, inactivated 11/05/2015 Given   pneumococcal 13-valent conjugate vaccine 11/05/2015 Given   Health Maintenance  Health Maintenance  ???Pending?(in the next year)  ??? ??OverDue  ??? ? ? ?Coronary Artery Disease Maintenance-Antiplatelet Agent Prescribed due??and every?  ??? ? ? ?Coronary Artery Disease Maintenance-Lipid Lowering Therapy due??and every?  ??? ? ? ?Pneumococcal Vaccine due??and every?  ??? ? ? ?Bone Density Screening due??05/26/18??and every 2??year(s)  ??? ? ? ?Advance Directive due??01/01/19??and every 1??year(s)  ??? ? ? ?Cognitive Screening due??01/01/19??and every 1??year(s)  ??? ? ? ?Geriatric Depression Screening due??01/01/19??and every 1??year(s)  ??? ? ? ?Aspirin Therapy for CVD Prevention due??06/28/19??and every 1??year(s)  ??? ??Due?  ??? ? ? ?ADL Screening due??10/18/19??and every 1??year(s)  ??? ? ? ?Tetanus Vaccine due??10/18/19??and every 10??year(s)  ??? ??Due In Future?  ??? ? ? ?Alcohol Misuse Screening not due until??01/01/20??and every 1??year(s)  ??? ? ? ?Fall Risk Assessment not due until??01/01/20??and every 1??year(s)  ??? ? ? ?Functional Assessment not due until??01/01/20??and every 1??year(s)  ??? ? ? ?Obesity Screening not due until??01/01/20??and every 1??year(s)  ??? ? ? ?Diabetes Maintenance-Foot Exam not due until??01/03/20??and every 1??year(s)  ??? ? ? ?Diabetes Maintenance-Eye Exam not due until??02/19/20??and every 1??year(s)  ??? ? ? ?Diabetes Maintenance-HgbA1c not due until??07/01/20??and every 1??year(s)  ??? ? ? ?Diabetes Maintenance-Fasting Lipid Profile not due until??07/01/20??and every 1??year(s)  ??? ? ? ?Hypertension Management-BMP not due until??07/01/20??and every 1??year(s)  ???Satisfied?(in the past 1 year)  ??? ??Satisfied?  ??? ? ? ?Alcohol Misuse Screening  on??07/02/19.??Satisfied by Pearl Godoy LPN  ??? ? ? ?Blood Pressure Screening on??10/18/19.??Satisfied by Semaj Fonseca LPN  ??? ? ? ?Body Mass Index Check on??10/18/19.??Satisfied by Semaj Fonseca LPN  ??? ? ? ?Breast Cancer Screening ([facility]) on??04/24/19.??Satisfied by Michelle Segundo MD  ??? ? ? ?Coronary Artery Disease Maintenance-Lipid Lowering Therapy on??12/27/18.??Satisfied by Michelle Segundo MD  ??? ? ? ?Depression Screening on??07/02/19.??Satisfied by Pearl Godoy LPN  ??? ? ? ?Diabetes Maintenance-HgbA1c on??07/02/19.??Satisfied by BILLY TAYLOR  ??? ? ? ?Diabetes Screening on??07/02/19.??Satisfied by BILLY TAYLOR  ??? ? ? ?Fall Risk Assessment on??08/20/19.??Satisfied by Kristina Craig RN  ??? ? ? ?Functional Assessment on??05/21/19.??Satisfied by Bernice Correa  ??? ? ? ?Hypertension Management-Blood Pressure on??10/18/19.??Satisfied by Semaj Fonseca LPN  ??? ? ? ?Lipid Screening on??07/02/19.??Satisfied by BILLY TAYLOR  ??? ? ? ?Obesity Screening on??10/18/19.??Satisfied by Semaj Fonseca LPN  ?      I reviewed the patients medical history, NPs findings on physical exam, the diagnosis and treatment plan. ?  Care provided was reasonable and necessary.? Reviewed films together.

## 2022-05-04 NOTE — HISTORICAL [facility] CERNER
This is a historical note converted from [employer]. Formatting and pictures may have been removed.  Please reference [employer] for original formatting and attached multimedia. Chief Complaint  4 week S/P left ring mallet finger repair-Sx 5/30/19-gl 8/28/19-pt. has kept her splint in tact-she does state she has changed the dressing since her last visit because her hand gets dirty because she states she has to use it-she has no pain today  History of Present Illness  This 73-year-old returns for her?left ring finger.? She is 4 weeks status post left ring finger mallet finger repair.? She is complaining of occasional pain.? She was unable to return to work as light duty.? She cannot return to work?until she can do full duty as a .  Physical Exam  Vitals & Measurements  HR:?67(Peripheral)? BP:?163/74?  HT:?155?cm? WT:?107.5?kg? BMI:?44.75?  Physical examination shows that her wound is clean and dry. ?Her Steri-Strip is removed. ?Under sterile conditions her pin is removed without difficulty.? Sterile dressings were applied to her finger.  ?  AP lateral and oblique of the left ring finger show that?her alignment is excellent with no subluxation.  Assessment/Plan  1.?Mallet deformity of left ring finger?M20.012  ?The findings were reviewed with the patient and she expressed understanding. ?The patient is placed in a stack finger splint.? The patient?will return to work with her splint. ?She will follow-up in 3 weeks with new x-rays. ?The patient may bathe her left hand. ?She is instructed to call if she has any problems prior to her next appointment.  Ordered:  Clinic Follow up, *Est. 07/17/19 3:00:00 CDT, Order for future visit, Mallet deformity of left ring finger  Encounter for removal of internal fixation device  Orthopedic aftercare, [facility]  Post-Op follow-up visit [id number] PC, Mallet deformity of left ring finger  Encounter for removal of internal fixation device  Orthopedic aftercare, [facility]  Clinic, 06/26/19 14:58:00 CDT  XR Hand Fourth Digit Right Min 2 Views, Routine, *Est. 07/17/19 3:00:00 CDT, Post-Op, None, Ambulatory, Patient Has IV?, Rad Type, Order for future visit, Mallet deformity of left ring finger  Encounter for removal of internal fixation device  Orthopedic aftercare, Not Scheduled, *Est. 07...  ?  2.?Encounter for removal of internal fixation device?Z47.2  Ordered:  Clinic Follow up, *Est. 07/17/19 3:00:00 CDT, Order for future visit, Mallet deformity of left ring finger  Encounter for removal of internal fixation device  Orthopedic aftercare, [facility]  Post-Op follow-up visit [id number] PC, Mallet deformity of left ring finger  Encounter for removal of internal fixation device  Orthopedic aftercare, [facility], 06/26/19 14:58:00 CDT  XR Hand Fourth Digit Right Min 2 Views, Routine, *Est. 07/17/19 3:00:00 CDT, Post-Op, None, Ambulatory, Patient Has IV?, Rad Type, Order for future visit, Mallet deformity of left ring finger  Encounter for removal of internal fixation device  Orthopedic aftercare, Not Scheduled, *Est. 07...  ?  3.?Orthopedic aftercare?Z47.89  Ordered:  Clinic Follow up, *Est. 07/17/19 3:00:00 CDT, Order for future visit, Mallet deformity of left ring finger  Encounter for removal of internal fixation device  [facility]  Post-Op follow-up visit [id number] PC, Mallet deformity of left ring finger  Encounter for removal of internal fixation device  [facility], 06/26/19 14:58:00 CDT  XR Hand Fourth Digit Right Min 2 Views, Routine, *Est. 07/17/19 3:00:00 CDT, Post-Op, None, Ambulatory, Patient Has IV?, Rad Type, Order for future visit, Mallet deformity of left ring finger  Encounter for removal of internal fixation device  Orthopedic aftercare, Not Scheduled, *Est. 07...  ?  Referrals  Clinic Follow up, *Est. 07/17/19 3:00:00 CDT, Order for future visit, Mallet deformity of left ring finger   Encounter for removal of internal fixation device  Orthopedic aftercare, [facility]   Problem List/Past Medical History  Ongoing  Anxiety  Arthritis  Bladder incontinence  Blockage of coronary artery of heart  Cardiac disease  Cataracts  Chronic venous insufficiency  BECCA - Insertion of stent into common iliac artery  DDD (degenerative disc disease), cervical  Depression  DIABETES MELLITUS  Gastro-esophageal reflux disease without esophagitis  Glasses  High cholesterol  Hypertension  Mallet deformity of left ring finger  Memory loss  Neuropathy  Numbness and tingling in hands  Obesity  Orthopedic aftercare  Primary osteoarthritis, left hand  Reflux  Thyroid nodule  Unsteady gait  Vitamin D deficiency, unspecified  Historical  has used steroids in last year  NIDDM  Obstructive sleep apnea  Shortness of breath  Weakness generalized  Procedure/Surgical History  Repair Left Hand Tendon, Open Approach (05/30/2019)  Repair of extensor tendon, distal insertion, primary or secondary; without graft (eg, mallet finger) (05/30/2019)  Tendon Repair (Left) (05/30/2019)  Colonoscopy (03/08/2018)  Colonoscopy, flexible; with removal of tumor(s), polyp(s), or other lesion(s) by snare technique (03/08/2018)  Excision of Ascending Colon, Via Natural or Artificial Opening Endoscopic, Diagnostic (03/08/2018)  Excision of Descending Colon, Via Natural or Artificial Opening Endoscopic, Diagnostic (03/08/2018)  Polypectomy (03/08/2018)  Biopsy Gastrointestinal (03/07/2018)  Esophagogastroduodenoscopy (03/07/2018)  Esophagogastroduodenoscopy, flexible, transoral; with biopsy, single or multiple (03/07/2018)  Excision of Duodenum, Via Natural or Artificial Opening Endoscopic, Diagnostic (03/07/2018)  Excision of Stomach, Pylorus, Via Natural or Artificial Opening Endoscopic, Diagnostic (03/07/2018)  Dilation of Right Common Iliac Artery with Two Intraluminal Devices, Percutaneous Approach (01/06/2017)  Intravascular ultrasound  (noncoronary vessel) during diagnostic evaluation and/or therapeutic intervention, including radiological supervision and interpretation; initial noncoronary vessel (List separately in addition to code for primary procedure) (01/06/2017)  Transcatheter placement of an intravascular stent(s), open or percutaneous, including radiological supervision and interpretation and including angioplasty within the same vessel, when performed; initial vein (01/06/2017)  angiogram (11/15/2016)  Injection Lumbar Epidural Steroid (., Back) (01/09/2014)  Injection of anesthetic into spinal canal for analgesia (01/09/2014)  Injection of other agent into spinal canal (01/09/2014)  Injection of steroid (01/09/2014)  Injection(s), anesthetic agent and/or steroid, transforaminal epidural, with imaging guidance (fluoroscopy or CT); lumbar or sacral, single level. (01/09/2014)  Other x-ray of lumbosacral spine (01/09/2014)  Closed [transurethral] biopsy of bladder (07/25/2013)  Cystourethroscopy, with biopsy(s). (07/25/2013)  Carpal tunnel release  Hysterectomy  rooster cone injections X2 right knee   Medications  acetaminophen-hydrocodone 325 mg-7.5 mg oral tablet, 1 tab(s), Oral, q4hr  Amoxil 500 mg Cap, 500 mg= 1 cap(s), Oral, TID  Aspir 81 oral delayed release tablet, 81 mg= 1 tab(s), Oral, Daily  carvedilol 25 mg oral tablet, 25 mg= 1 tab(s), Oral, BID  hydrochlorothiazide-losartan 12.5 mg-100 mg oral tablet, 1 tab(s), Oral, Daily  losartan 100 mg oral tablet, 100 mg= 1 tab(s), Oral, Daily  magnesium oxide 400 mg (240 mg elemental magnesium) oral tablet, 400 mg= 1 tab(s), Oral, Daily  metformin 850 mg oral tablet, See Instructions, 1 refills  POTASSIUM CHLORIDE ER 20 MEQ TBCR, 20 mEq= 1 tab(s), Oral, Daily  rivastigmine 6 mg oral capsule, 6 mg= 1 cap(s), Oral, BID  RIVASTIGMINE TARTRATE 4.5 MG CAPS, 4.5 mg= 1 cap(s), Oral, BID  rosuvastatin 20 mg oral tablet, See Instructions, 1 refills  SUCRALFATE 1 GM TABS, 1 gm= 1 tab(s), Oral,  QID  tamsulosin 0.4 mg oral capsule, 0.4 mg= 1 cap(s), Oral, Daily, 5 refills  Toradol for IM, 60 mg= 2 mL, IM, Once  torsemide 20 mg oral tablet  Vitamin D3 5000 intl units oral capsule, 1 tab(s), Oral, Daily  Allergies  Lyrica?(fatigue)  Social History  Abuse/Neglect  No, 06/26/2019  Alcohol - Denies Alcohol Use, 07/18/2013  Never, 11/05/2015  Employment/School  Employed, 11/05/2015  Retired, Highest education level: High school., 07/18/2013  Exercise  Exercise duration: 0., 11/05/2015  Home/Environment  Lives with Children. Living situation: Home with assistance., 01/05/2017  Lives with Alone., 11/05/2015  Nutrition/Health  Type of diet: Regular Diet. Regular, 11/05/2015  Sexual  Sexually active: No., 11/05/2015  Substance Use - Denies Substance Abuse, 07/18/2013  Never, 11/05/2015  Tobacco - Denies Tobacco Use, 01/20/2013  Never (less than 100 in lifetime), N/A, 06/26/2019  Family History  Cardiac arrest.: Brother.  Immunizations  Vaccine Date Status   influenza virus vaccine, inactivated 11/05/2015 Given   pneumococcal 13-valent conjugate vaccine 11/05/2015 Given   Health Maintenance  Health Maintenance  ???Pending?(in the next year)  ??? ??OverDue  ??? ? ? ?Coronary Artery Disease Maintenance-Antiplatelet Agent Prescribed due??and every?  ??? ? ? ?Coronary Artery Disease Maintenance-Lipid Lowering Therapy due??and every?  ??? ? ? ?Pneumococcal Vaccine due??and every?  ??? ? ? ?Bone Density Screening due??05/26/18??and every 2??year(s)  ??? ? ? ?Advance Directive due??01/01/19??and every 1??year(s)  ??? ? ? ?Alcohol Misuse Screening due??01/01/19??and every 1??year(s)  ??? ? ? ?Cognitive Screening due??01/01/19??and every 1??year(s)  ??? ? ? ?Geriatric Depression Screening due??01/01/19??and every 1??year(s)  ??? ??Due?  ??? ? ? ?ADL Screening due??06/26/19??and every 1??year(s)  ??? ? ? ?Tetanus Vaccine due??06/26/19??and every 10??year(s)  ??? ??Due In Future?  ??? ? ? ?Aspirin Therapy for CVD Prevention not  due until??06/28/19??and every 1??year(s)  ??? ? ? ?Diabetes Maintenance-Fasting Lipid Profile not due until??07/09/19??and every 1??year(s)  ??? ? ? ?Fall Risk Assessment not due until??01/01/20??and every 1??year(s)  ??? ? ? ?Functional Assessment not due until??01/01/20??and every 1??year(s)  ??? ? ? ?Obesity Screening not due until??01/01/20??and every 1??year(s)  ??? ? ? ?Diabetes Maintenance-Foot Exam not due until??01/03/20??and every 1??year(s)  ??? ? ? ?Diabetes Maintenance-Eye Exam not due until??02/19/20??and every 1??year(s)  ??? ? ? ?Diabetes Maintenance-HgbA1c not due until??05/20/20??and every 1??year(s)  ??? ? ? ?Hypertension Management-BMP not due until??05/20/20??and every 1??year(s)  ??? ? ? ?Hypertension Management-Blood Pressure not due until??06/25/20??and every 1??year(s)  ???Satisfied?(in the past 1 year)  ??? ??Satisfied?  ??? ? ? ?Advance Directive on??06/28/18.??Satisfied by Pearl Godoy LPN  ??? ? ? ?Alcohol Misuse Screening on??06/28/18.??Satisfied by Pearl Godoy LPN  ??? ? ? ?Aspirin Therapy for CVD Prevention on??06/28/18.??Satisfied by Pearl Godoy LPN??Reason: Expectation Satisfied Elsewhere  ??? ? ? ?Blood Pressure Screening on??06/26/19.??Satisfied by Rola Wade  ??? ? ? ?Body Mass Index Check on??06/26/19.??Satisfied by Rola Wade  ??? ? ? ?Breast Cancer Screening ([facility]) on??04/24/19.??Satisfied by Michelle Segundo MD  ??? ? ? ?Coronary Artery Disease Maintenance-Lipid Lowering Therapy on??12/27/18.??Satisfied by Michelle Segundo MD  ??? ? ? ?Depression Screening on??01/03/19.??Satisfied by Pearl Godoy LPN  ??? ? ? ?Diabetes Maintenance-HgbA1c on??05/21/19.??Satisfied by Harsh Gerber  ??? ? ? ?Diabetes Screening on??05/21/19.??Satisfied by Harsh Gerber  ??? ? ? ?Fall Risk Assessment on??05/30/19.??Satisfied by Nick HAWKINS, Celia Medrano  ??? ? ? ?Functional Assessment on??05/21/19.??Satisfied by Madeline  Bernice Francis  ??? ? ? ?Hypertension Management-Blood Pressure on??06/26/19.??Satisfied by Rola Wade  ??? ? ? ?Lipid Screening on??07/09/18.??Satisfied by Magdy Bahena  ??? ? ? ?Obesity Screening on??06/26/19.??Satisfied by Rola Wade  ?

## 2022-05-04 NOTE — HISTORICAL [facility]ER
This is a historical note converted from [employer]. Formatting and pictures may have been removed.  Please reference [facility] for original formatting and attached multimedia. Chief Complaint  1 wk S/P. Left ring mallet finger repair vs. dip fusion. Sx 5/30/19 Gl 8/28/19.  History of Present Illness  This 73-year-old returns for her left ring finger.? She is now 1 week status post?mallet finger repair and pinning. ?She is complaining of minimal pain.  Review of Systems  Constitutional: No fever, weakness, or fatigue.  Ear/Nose/Mouth/Throat: No nasal congestion or sore throat.  Respiratory: No shortness of breath or cough.  Cardiovascular: No chest pain, palpitations, or peripheral edema.  Gastrointestinal: No nausea, vomiting, or abdominal pain.  Genitourinary: No dysuria.  Musculoskeletal: See current complaints  Integumentary: Negative.  Physical Exam  Vitals & Measurements  HR:?67(Peripheral)? BP:?163/74?  HT:?155?cm? WT:?107.5?kg? BMI:?44.75?  Physical examination shows that her pin site is clean and dry. ?Her alignment is maintained.? Her wound is clean and dry with no evidence of infection and her sutures are removed.? Sterile dressings are applied and her splint is reapplied.  ?  AP lateral and oblique of the left?ring finger shows that her alignment is maintained and her pin position is unchanged.  Assessment/Plan  1.?Mallet deformity of left ring finger?M20.012  ? The findings were reviewed with the patient and she expressed understanding.? I will see the patient back in 3 weeks with new x-rays.? If I feel that she is sufficiently healed I will remove her pin at that time.? She is to continue to wear her splint at all times.? She cannot immerse her hand?in water.? Instructed to call if she has any problems prior to her next appointment.  Ordered:  Clinic Follow up, *Est. 06/26/19 3:00:00 CDT, Order for future visit, Mallet deformity of left ring finger  Orthopedic aftercare, [facility]  Post-Op follow-up  visit [id number] PC, Mallet deformity of left ring finger  Orthopedic aftercare, [facility], 06/05/19 14:45:00 CDT  XR Hand Fourth Digit Left Min 2 Views, Routine, *Est. 06/26/19 3:00:00 CDT, Post Reduction, None, Ambulatory, Patient Has IV?, Rad Type, Order for future visit, Mallet deformity of left ring finger  Orthopedic aftercare, Not Scheduled, *Est. 06/26/19 3:00:00 CDT  ?  2.?Orthopedic aftercare?Z47.89  Ordered:  Clinic Follow up, *Est. 06/26/19 3:00:00 CDT, Order for future visit, Mallet deformity of left ring finger  Orthopedic aftercare, [facility]  Post-Op follow-up visit [id number] PC, Mallet deformity of left ring finger  Orthopedic aftercare, [facility], 06/05/19 14:45:00 CDT  XR Hand Fourth Digit Left Min 2 Views, Routine, *Est. 06/26/19 3:00:00 CDT, Post Reduction, None, Ambulatory, Patient Has IV?, Rad Type, Order for future visit, Mallet deformity of left ring finger  Orthopedic aftercare, Not Scheduled, *Est. 06/26/19 3:00:00 CDT  ?  Referrals  Clinic Follow up, *Est. 06/26/19 3:00:00 CDT, Order for future visit, Mallet deformity of left ring finger  Orthopedic aftercare, [facility]   Problem List/Past Medical History  Ongoing  Anxiety  Arthritis  Bladder incontinence  Blockage of coronary artery of heart  Cardiac disease  Cataracts  Chronic venous insufficiency  BECCA - Insertion of stent into common iliac artery  DDD (degenerative disc disease), cervical  Depression  DIABETES MELLITUS  Gastro-esophageal reflux disease without esophagitis  Glasses  High cholesterol  Hypertension  Mallet deformity of left ring finger  Memory loss  Neuropathy  Numbness and tingling in hands  Obesity  Orthopedic aftercare  Primary osteoarthritis, left hand  Reflux  Thyroid nodule  Unsteady gait  Vitamin D deficiency, unspecified  Historical  has used steroids in last year  NIDDM  Obstructive sleep apnea  Shortness of breath  Weakness generalized  Procedure/Surgical History  Repair Left Hand  Tendon, Open Approach (05/30/2019)  Repair of extensor tendon, distal insertion, primary or secondary; without graft (eg, mallet finger) (05/30/2019)  Tendon Repair (Left) (05/30/2019)  Colonoscopy (03/08/2018)  Colonoscopy, flexible; with removal of tumor(s), polyp(s), or other lesion(s) by snare technique (03/08/2018)  Excision of Ascending Colon, Via Natural or Artificial Opening Endoscopic, Diagnostic (03/08/2018)  Excision of Descending Colon, Via Natural or Artificial Opening Endoscopic, Diagnostic (03/08/2018)  Polypectomy (03/08/2018)  Biopsy Gastrointestinal (03/07/2018)  Esophagogastroduodenoscopy (03/07/2018)  Esophagogastroduodenoscopy, flexible, transoral; with biopsy, single or multiple (03/07/2018)  Excision of Duodenum, Via Natural or Artificial Opening Endoscopic, Diagnostic (03/07/2018)  Excision of Stomach, Pylorus, Via Natural or Artificial Opening Endoscopic, Diagnostic (03/07/2018)  Dilation of Right Common Iliac Artery with Two Intraluminal Devices, Percutaneous Approach (01/06/2017)  Intravascular ultrasound (noncoronary vessel) during diagnostic evaluation and/or therapeutic intervention, including radiological supervision and interpretation; initial noncoronary vessel (List separately in addition to code for primary procedure) (01/06/2017)  Transcatheter placement of an intravascular stent(s), open or percutaneous, including radiological supervision and interpretation and including angioplasty within the same vessel, when performed; initial vein (01/06/2017)  angiogram (11/15/2016)  Injection Lumbar Epidural Steroid (., Back) (01/09/2014)  Injection of anesthetic into spinal canal for analgesia (01/09/2014)  Injection of other agent into spinal canal (01/09/2014)  Injection of steroid (01/09/2014)  Injection(s), anesthetic agent and/or steroid, transforaminal epidural, with imaging guidance (fluoroscopy or CT); lumbar or sacral, single level. (01/09/2014)  Other x-ray of lumbosacral spine  (01/09/2014)  Closed [transurethral] biopsy of bladder (07/25/2013)  Cystourethroscopy, with biopsy(s). (07/25/2013)  Carpal tunnel release  Hysterectomy  rooster cone injections X2 right knee   Medications  Aspir 81 oral delayed release tablet, 81 mg= 1 tab(s), Oral, Daily  carvedilol 25 mg oral tablet, 25 mg= 1 tab(s), Oral, BID  clopidogrel 75 mg oral tablet, See Instructions, 1 refills,? ?Not Taking per Prescriber  magnesium oxide 400 mg (240 mg elemental magnesium) oral tablet, 400 mg= 1 tab(s), Oral, Daily  metformin 850 mg oral tablet, See Instructions, 1 refills  POTASSIUM CHLORIDE ER 20 MEQ TBCR, 20 mEq= 1 tab(s), Oral, Daily  RIVASTIGMINE TARTRATE 4.5 MG CAPS, 4.5 mg= 1 cap(s), Oral, BID  rosuvastatin 20 mg oral tablet, See Instructions, 1 refills  SUCRALFATE 1 GM TABS, 1 gm= 1 tab(s), Oral, QID  tamsulosin 0.4 mg oral capsule, 0.4 mg= 1 cap(s), Oral, Daily, 5 refills  Toradol for IM, 60 mg= 2 mL, IM, Once  torsemide 20 mg oral tablet  Vitamin D3 5000 intl units oral capsule, 1 tab(s), Oral, Daily  Allergies  Lyrica?(fatigue)  Social History  Alcohol - Denies Alcohol Use, 07/18/2013  Never, 11/05/2015  Employment/School  Employed, 11/05/2015  Retired, Highest education level: High school., 07/18/2013  Exercise  Exercise duration: 0., 11/05/2015  Home/Environment  Lives with Children. Living situation: Home with assistance., 01/05/2017  Lives with Alone., 11/05/2015  Nutrition/Health  Type of diet: Regular Diet. Regular, 11/05/2015  Sexual  Sexually active: No., 11/05/2015  Substance Abuse - Denies Substance Abuse, 07/18/2013  Never, 11/05/2015  Tobacco - Denies Tobacco Use, 01/20/2013  Never (less than 100 in lifetime), No, 06/05/2019  Family History  Cardiac arrest.: Brother.  Immunizations  Vaccine Date Status   influenza virus vaccine, inactivated 11/05/2015 Given   pneumococcal 13-valent conjugate vaccine 11/05/2015 Given   Health Maintenance  Health Maintenance  ???Pending?(in the next year)  ???  ??OverDue  ??? ? ? ?Coronary Artery Disease Maintenance-Antiplatelet Agent Prescribed due??and every?  ??? ? ? ?Coronary Artery Disease Maintenance-Lipid Lowering Therapy due??and every?  ??? ? ? ?Pneumococcal Vaccine due??and every?  ??? ? ? ?Bone Density Screening due??05/26/18??and every 2??year(s)  ??? ? ? ?Advance Directive due??01/01/19??and every 1??year(s)  ??? ? ? ?Alcohol Misuse Screening due??01/01/19??and every 1??year(s)  ??? ? ? ?Cognitive Screening due??01/01/19??and every 1??year(s)  ??? ? ? ?Geriatric Depression Screening due??01/01/19??and every 1??year(s)  ??? ??Due?  ??? ? ? ?ADL Screening due??06/05/19??and every 1??year(s)  ??? ? ? ?Tetanus Vaccine due??06/05/19??and every 10??year(s)  ??? ??Due In Future?  ??? ? ? ?Aspirin Therapy for CVD Prevention not due until??06/28/19??and every 1??year(s)  ??? ? ? ?Diabetes Maintenance-Fasting Lipid Profile not due until??07/09/19??and every 1??year(s)  ??? ? ? ?Fall Risk Assessment not due until??01/01/20??and every 1??year(s)  ??? ? ? ?Functional Assessment not due until??01/01/20??and every 1??year(s)  ??? ? ? ?Obesity Screening not due until??01/01/20??and every 1??year(s)  ??? ? ? ?Diabetes Maintenance-Foot Exam not due until??01/03/20??and every 1??year(s)  ??? ? ? ?Diabetes Maintenance-Eye Exam not due until??02/19/20??and every 1??year(s)  ??? ? ? ?Diabetes Maintenance-HgbA1c not due until??05/20/20??and every 1??year(s)  ??? ? ? ?Hypertension Management-BMP not due until??05/20/20??and every 1??year(s)  ??? ? ? ?Hypertension Management-Blood Pressure not due until??06/04/20??and every 1??year(s)  ???Satisfied?(in the past 1 year)  ??? ??Satisfied?  ??? ? ? ?Advance Directive on??06/28/18.??Satisfied by Pearl Godoy LPN  ??? ? ? ?Alcohol Misuse Screening on??06/28/18.??Satisfied by Pearl Godoy LPN  ??? ? ? ?Aspirin Therapy for CVD Prevention on??06/28/18.??Satisfied by Pearl Godoy LPN??Reason: Expectation Satisfied  Elsewhere  ??? ? ? ?Blood Pressure Screening on??06/05/19.??Satisfied by Bernice Correa  ??? ? ? ?Body Mass Index Check on??06/05/19.??Satisfied by Bernice Correa  ??? ? ? ?Breast Cancer Screening ([facility]) on??04/24/19.??Satisfied by Michelle Segundo MD  ??? ? ? ?Coronary Artery Disease Maintenance-Antiplatelet Agent Prescribed on??12/27/18.??Satisfied by Michelle Segundo MD  ??? ? ? ?Depression Screening on??01/03/19.??Satisfied by Pearl Godoy LPN  ??? ? ? ?Diabetes Maintenance-HgbA1c on??05/21/19.??Satisfied by Harsh Gerber  ??? ? ? ?Diabetes Screening on??05/21/19.??Satisfied by Harsh Gerber  ??? ? ? ?Fall Risk Assessment on??05/30/19.??Satisfied by Nick HAWKINS, Celia Medrano  ??? ? ? ?Functional Assessment on??05/21/19.??Satisfied by Bernice Correa  ??? ? ? ?Hypertension Management-Blood Pressure on??06/05/19.??Satisfied by Bernice Correa  ??? ? ? ?Lipid Screening on??07/09/18.??Satisfied by Magdy Bahena  ??? ? ? ?Obesity Screening on??06/05/19.??Satisfied by Bernice Correa  ?  ?

## 2022-05-04 NOTE — HISTORICAL [facility]
This is a historical note converted from [employer]. Formatting and pictures may have been removed.  Please reference [facility] for original formatting and attached multimedia. Chief Complaint  7 WEEK F/U LEFT RING MALLET FINGER REPAIR SX ON 5/30/19 GLOBAL 8/28/19 ?HERE TODAY FOR X-RAYS.  History of Present Illness  Patient is 7 weeks s/p left ring finger mallet finger repair performed per Dr. Brewer. Here today for x-rays and evaluation. States she is back at work without any difficulty. Doing well overall.  Review of Systems  Constitutional: negative except as stated in HPI  Eye: negative except as stated in HPI  ENMT: negative except as stated in HPI  Respiratory: negative except as stated in HPI  Cardiovascular: negative except as stated in HPI  Gastrointestinal: negative except as stated in HPI  Genitourinary: negative except as stated in HPI  Hema/Lymph: negative except as stated in HPI  Endocrine: negative except as stated in HPI  Immunologic: negative except as stated in HPI  Musculoskeletal: negative except as stated in HPI  Integumentary: negative except as stated in HPI  Neurologic: negative except as stated in HPI  ?   All Other ROS_ ?negative except as stated in HPI  Physical Exam  Vitals & Measurements  T:?97.3? ?F (Oral)? BP:?129/65?  HT:?155?cm? WT:?101.3?kg? BMI:?42.16?  General-Alert, oriented, no fever, chills  Musculoskeletal-LUE-left right finger wound is clean dry and intact. No signs of infection.NVID. Moves all digits well. BCR all digits. RP 2+  ?Neurologic-Alert and oriented x4, cooperative  ?Dermatologic-Skin warm, pink, dry.  Assessment/Plan  1.?Mallet deformity of left ring finger?M20.012  Ordered:  Clinic Follow up, *Est. 08/15/19 3:00:00 CDT, Order for future visit, Mallet deformity of left ring finger  Orthopedic aftercare, [facility]  Post-Op follow-up visit [id number] PC, Mallet deformity of left ring finger  Orthopedic aftercare, [facility] Clinic, 07/18/19 14:14:00  CDT  ?  2.?Orthopedic aftercare?Z47.89  Ordered:  Clinic Follow up, *Est. 08/15/19 3:00:00 CDT, Order for future visit, Mallet deformity of left ring finger  [facility]  Post-Op follow-up visit [id number] PC, Mallet deformity of left ring finger  [facility] 14:14:00 CDT  ?  ?  Patient doing well. She can continue WBAT LUE. Continue to work on ROM, strengthening and stretching exercises LUE especially left ring finger. Patient states she is still wearing her stack finger splint during the day at work. Informed patient it is ok to wear splint only at night. She will RTC in 4 weeks for repeat x-rays and evaluation. Patient agrees with treatment plan and all questions and concerns were addressed.  Referrals  Clinic Follow up, *Est. 08/15/19 3:00:00 CDT, Order for future visit, Mallet deformity of left ring finger  Orthopedic aftercare, [facility]   Problem List/Past Medical History  Ongoing  Anxiety  Arthritis  Bladder incontinence  Blockage of coronary artery of heart  Cardiac disease  Cataracts  Chronic venous insufficiency  BECCA - Insertion of stent into common iliac artery  DDD (degenerative disc disease), cervical  Depression  DIABETES MELLITUS  Gastro-esophageal reflux disease without esophagitis  Glasses  High cholesterol  Hypertension  Mallet deformity of left ring finger  Memory loss  Neuropathy  Numbness and tingling in hands  Obesity  Orthopedic aftercare  Primary osteoarthritis, left hand  Reflux  Thyroid nodule  Unsteady gait  Vitamin D deficiency, unspecified  Historical  has used steroids in last year  NIDDM  Obstructive sleep apnea  Shortness of breath  Weakness generalized  Procedure/Surgical History  Repair Left Hand Tendon, Open Approach (05/30/2019)  Repair of extensor tendon, distal insertion, primary or secondary; without graft (eg, mallet finger) (05/30/2019)  Tendon Repair (Left) (05/30/2019)  Colonoscopy  (03/08/2018)  Colonoscopy, flexible; with removal of tumor(s), polyp(s), or other lesion(s) by snare technique (03/08/2018)  Excision of Ascending Colon, Via Natural or Artificial Opening Endoscopic, Diagnostic (03/08/2018)  Excision of Descending Colon, Via Natural or Artificial Opening Endoscopic, Diagnostic (03/08/2018)  Polypectomy (03/08/2018)  Biopsy Gastrointestinal (03/07/2018)  Esophagogastroduodenoscopy (03/07/2018)  Esophagogastroduodenoscopy, flexible, transoral; with biopsy, single or multiple (03/07/2018)  Excision of Duodenum, Via Natural or Artificial Opening Endoscopic, Diagnostic (03/07/2018)  Excision of Stomach, Pylorus, Via Natural or Artificial Opening Endoscopic, Diagnostic (03/07/2018)  Dilation of Right Common Iliac Artery with Two Intraluminal Devices, Percutaneous Approach (01/06/2017)  Intravascular ultrasound (noncoronary vessel) during diagnostic evaluation and/or therapeutic intervention, including radiological supervision and interpretation; initial noncoronary vessel (List separately in addition to code for primary procedure) (01/06/2017)  Transcatheter placement of an intravascular stent(s), open or percutaneous, including radiological supervision and interpretation and including angioplasty within the same vessel, when performed; initial vein (01/06/2017)  angiogram (11/15/2016)  Injection Lumbar Epidural Steroid (., Back) (01/09/2014)  Injection of anesthetic into spinal canal for analgesia (01/09/2014)  Injection of other agent into spinal canal (01/09/2014)  Injection of steroid (01/09/2014)  Injection(s), anesthetic agent and/or steroid, transforaminal epidural, with imaging guidance (fluoroscopy or CT); lumbar or sacral, single level. (01/09/2014)  Other x-ray of lumbosacral spine (01/09/2014)  Closed [transurethral] biopsy of bladder (07/25/2013)  Cystourethroscopy, with biopsy(s). (07/25/2013)  Carpal tunnel release  Hysterectomy  rooster cone injections X2 right knee    Medications  acetaminophen-hydrocodone 325 mg-7.5 mg oral tablet, 1 tab(s), Oral, q4hr  Aspir 81 oral delayed release tablet, 81 mg= 1 tab(s), Oral, Daily  carvedilol 25 mg oral tablet, 25 mg= 1 tab(s), Oral, BID  hydrochlorothiazide-losartan 12.5 mg-100 mg oral tablet, 1 tab(s), Oral, Daily  losartan 100 mg oral tablet, 100 mg= 1 tab(s), Oral, Daily  magnesium oxide 400 mg (240 mg elemental magnesium) oral tablet, 400 mg= 1 tab(s), Oral, Daily  metformin 850 mg oral tablet, See Instructions, 1 refills  omeprazole 40 mg oral DR capsule, 40 mg= 1 cap(s), Oral, Daily, 3 refills  POTASSIUM CHLORIDE ER 20 MEQ TBCR, 20 mEq= 1 tab(s), Oral, Daily  rivastigmine 6 mg oral capsule, 6 mg= 1 cap(s), Oral, BID  rosuvastatin 20 mg oral tablet, See Instructions, 1 refills  SUCRALFATE 1 GM TABS, 1 gm= 1 tab(s), Oral, QID,? ?Not taking: Last Dose Date/Time Unknown  tamsulosin 0.4 mg oral capsule, 0.4 mg= 1 cap(s), Oral, Daily, 5 refills  Toradol for IM, 60 mg= 2 mL, IM, Once  torsemide 20 mg oral tablet  Vitamin D3 5000 intl units oral capsule, 1 tab(s), Oral, Daily  Allergies  Lyrica?(fatigue)  Social History  Abuse/Neglect  No, 07/18/2019  Alcohol - Denies Alcohol Use, 07/18/2013  Never, 11/05/2015  Employment/School  Employed, 11/05/2015  Retired, Highest education level: High school., 07/18/2013  Exercise  Exercise duration: 0., 11/05/2015  Home/Environment  Lives with Children. Living situation: Home with assistance., 01/05/2017  Lives with Alone., 11/05/2015  Nutrition/Health  Type of diet: Regular Diet. Regular, 11/05/2015  Sexual  Sexually active: No., 11/05/2015  Spiritual/Cultural  None, 07/02/2019  Substance Use - Denies Substance Abuse, 07/18/2013  Never, 11/05/2015  Tobacco - Denies Tobacco Use, 01/20/2013  Never (less than 100 in lifetime), N/A, 07/18/2019  Family History  Cardiac arrest.: Brother.  Immunizations  Vaccine Date Status   influenza virus vaccine, inactivated 11/05/2015 Given   pneumococcal 13-valent  conjugate vaccine 11/05/2015 Given   Health Maintenance  Health Maintenance  ???Pending?(in the next year)  ??? ??OverDue  ??? ? ? ?Coronary Artery Disease Maintenance-Antiplatelet Agent Prescribed due??and every?  ??? ? ? ?Coronary Artery Disease Maintenance-Lipid Lowering Therapy due??and every?  ??? ? ? ?Pneumococcal Vaccine due??and every?  ??? ? ? ?Bone Density Screening due??05/26/18??and every 2??year(s)  ??? ? ? ?Advance Directive due??01/01/19??and every 1??year(s)  ??? ? ? ?Cognitive Screening due??01/01/19??and every 1??year(s)  ??? ? ? ?Geriatric Depression Screening due??01/01/19??and every 1??year(s)  ??? ??Due?  ??? ? ? ?Aspirin Therapy for CVD Prevention due??06/28/19??and every 1??year(s)  ??? ? ? ?ADL Screening due??07/18/19??and every 1??year(s)  ??? ? ? ?Tetanus Vaccine due??07/18/19??and every 10??year(s)  ??? ??Due In Future?  ??? ? ? ?Alcohol Misuse Screening not due until??01/01/20??and every 1??year(s)  ??? ? ? ?Fall Risk Assessment not due until??01/01/20??and every 1??year(s)  ??? ? ? ?Functional Assessment not due until??01/01/20??and every 1??year(s)  ??? ? ? ?Obesity Screening not due until??01/01/20??and every 1??year(s)  ??? ? ? ?Diabetes Maintenance-Foot Exam not due until??01/03/20??and every 1??year(s)  ??? ? ? ?Diabetes Maintenance-Eye Exam not due until??02/19/20??and every 1??year(s)  ??? ? ? ?Diabetes Maintenance-HgbA1c not due until??07/01/20??and every 1??year(s)  ??? ? ? ?Diabetes Maintenance-Fasting Lipid Profile not due until??07/01/20??and every 1??year(s)  ??? ? ? ?Hypertension Management-BMP not due until??07/01/20??and every 1??year(s)  ??? ? ? ?Hypertension Management-Blood Pressure not due until??07/17/20??and every 1??year(s)  ???Satisfied?(in the past 1 year)  ??? ??Satisfied?  ??? ? ? ?Alcohol Misuse Screening on??07/02/19.??Satisfied by Pearl Godoy LPN  ??? ? ? ?Blood Pressure Screening on??07/18/19.??Satisfied by Benita Lozano  ??? ? ? ?Body Mass Index  Check on??07/18/19.??Satisfied by Benita Lozano  ??? ? ? ?Breast Cancer Screening ([facility]) on??04/24/19.??Satisfied by Michelle Segundo MD  ??? ? ? ?Coronary Artery Disease Maintenance-Lipid Lowering Therapy on??12/27/18.??Satisfied by Michelle Segundo MD  ??? ? ? ?Depression Screening on??07/02/19.??Satisfied by Pearl Godoy LPN  ??? ? ? ?Diabetes Maintenance-HgbA1c on??07/02/19.??Satisfied by BILLY TAYLOR  ??? ? ? ?Diabetes Screening on??07/02/19.??Satisfied by BILLY TAYLOR  ??? ? ? ?Fall Risk Assessment on??07/02/19.??Satisfied by Pearl Godoy LPN  ??? ? ? ?Functional Assessment on??05/21/19.??Satisfied by Bernice Correa  ??? ? ? ?Hypertension Management-Blood Pressure on??07/18/19.??Satisfied by Benita Lozano  ??? ? ? ?Lipid Screening on??07/02/19.??Satisfied by BILLY TAYLOR  ??? ? ? ?Obesity Screening on??07/18/19.??Satisfied by Benita Lozano  ?  Diagnostic Results  Left hand fourth digits: X-ray shows acceptable alignment, evidence of interval consolidation noted

## 2022-05-04 NOTE — HISTORICAL [facility] C[facility]ER
This is a historical note converted from [employer]. Formatting and pictures may have been removed.  Please reference [employer] for original formatting and attached multimedia. Chief Complaint  Left hand pain. Pt fell around [place] of 2018.  History of Present Illness  This 73-year-old comes in for her left hand.? The patient fell around [place] time. ?She noticed a deformity of her left fourth finger and treated conservatively. ?However it is not improving. ?She states that her finger feels best when she wraps it with duct tape.  Review of Systems  Constitutional: No fever, weakness, or fatigue.  Ear/Nose/Mouth/Throat: No nasal congestion or sore throat.  Respiratory: No shortness of breath or cough.  Cardiovascular: No chest pain, palpitations, or peripheral edema.? Peripheral vascular disease  Gastrointestinal: No nausea, vomiting, or abdominal pain.  Genitourinary: No dysuria.  Musculoskeletal: See current complaints  Integumentary: Negative.  Physical Exam  Vitals & Measurements  HR:?75(Peripheral)? BP:?165/72?  HT:?155?cm? WT:?107.5?kg? BMI:?44.75?  Physical examination shows that she has an obvious mallet deformity of the left ring finger. ?It is passively correctable but she does not have active extension.? She has fair  strength. ?She is motor and sensory intact. ?She has obvious?Bouchards nodes?with arthritic changes in her DIP joints on fingers 234.? She?has no other deformity noted.  ?  AP lateral oblique of the left hand shows arthritic changes at the DIP joint of multiple fingers.? She may have had a fracture of the?middle phalanx?of the ring finger which appears to have healed. ?Mallet deformity is noted. ?This is not a bony mallet.  Assessment/Plan  1.?Mallet deformity of left ring finger?M20.012  ? The findings were reviewed with the patient and she expressed understanding.? Given the late date?she may not?be amenable to repair.? The patient would like to pursue surgery.? Plan for left?fourth  finger?mallet repair and pinning versus DIP fusion.? Cardiac clearance will be obtained prior to surgery. ?Her Plavix will need to be stopped 5 days before surgery and resume the night of surgery.? The patient?will be seen back for preoperative paperwork once cardiac clearance has been obtained.  Ordered:  Clinic Follow-up Pre Op, 04/23/19 15:07:00 CDT, Order for future visit, Mallet deformity of left ring finger  Primary osteoarthritis, left hand, [facility]  Office/Outpatient Visit Level 3 New [id number] PC, Mallet deformity of left ring finger  Primary osteoarthritis, left hand, Orthopaedics Clinic, 04/23/19 15:07:00 CDT  Schedule Surgery, L Ring Mallet Finger Repair vs DIP Fusion, Patients Request, 04/23/19 15:07:00 CDT, Mallet deformity of left ring finger  Primary osteoarthritis, left hand  ?  2.?Primary osteoarthritis, left hand?M19.042  Ordered:  Clinic Follow-up Pre Op, 04/23/19 15:07:00 CDT, Order for future visit, Mallet deformity of left ring finger  Primary osteoarthritis, left hand, Orthopaedics  Office/Outpatient Visit Level 3 New [id number] PC, Mallet deformity of left ring finger  Primary osteoarthritis, left hand, Orthopaedics Clinic, 04/23/19 15:07:00 CDT  Schedule Surgery, L Ring Mallet Finger Repair vs DIP Fusion, Patients Request, 04/23/19 15:07:00 CDT, Mallet deformity of left ring finger  Primary osteoarthritis, left hand  ?  Orders:  XR Hand Left Minimum 3 Views, Routine, 04/23/19 14:37:00 CDT, Fall, None, Ambulatory, Patient Has IV?, Rad Type, Left hand pain, Not Scheduled, 04/23/19 14:37:00 CDT   Problem List/Past Medical History  Ongoing  Anxiety  Arthritis  Bladder incontinence  Blockage of coronary artery of heart  Cardiac disease  Cataracts  Chronic venous insufficiency  DDD (degenerative disc disease), cervical  Depression  DIABETES MELLITUS  Gastro-esophageal reflux disease without esophagitis  Glasses  High cholesterol  Hypertension  Hypertension  Mallet deformity of left ring  finger  Memory loss  Neuropathy  Numbness and tingling in hands  Obesity  Obstructive sleep apnea  Primary osteoarthritis, left hand  Reflux  Shortness of breath  Thyroid nodule  Unsteady gait  Vitamin D deficiency, unspecified  Weakness generalized  Historical  has used steroids in last year  NIDDM  Procedure/Surgical History  Colonoscopy (03/08/2018)  Colonoscopy, flexible; with removal of tumor(s), polyp(s), or other lesion(s) by snare technique (03/08/2018)  Excision of Ascending Colon, Via Natural or Artificial Opening Endoscopic, Diagnostic (03/08/2018)  Excision of Descending Colon, Via Natural or Artificial Opening Endoscopic, Diagnostic (03/08/2018)  Polypectomy (03/08/2018)  Biopsy Gastrointestinal (03/07/2018)  Esophagogastroduodenoscopy (03/07/2018)  Esophagogastroduodenoscopy, flexible, transoral; with biopsy, single or multiple (03/07/2018)  Excision of Duodenum, Via Natural or Artificial Opening Endoscopic, Diagnostic (03/07/2018)  Excision of Stomach, Pylorus, Via Natural or Artificial Opening Endoscopic, Diagnostic (03/07/2018)  Dilation of Right Common Iliac Artery with Two Intraluminal Devices, Percutaneous Approach (01/06/2017)  Intravascular ultrasound (noncoronary vessel) during diagnostic evaluation and/or therapeutic intervention, including radiological supervision and interpretation; initial noncoronary vessel (List separately in addition to code for primary procedure) (01/06/2017)  Transcatheter placement of an intravascular stent(s), open or percutaneous, including radiological supervision and interpretation and including angioplasty within the same vessel, when performed; initial vein (01/06/2017)  angiogram (11/15/2016)  Injection Lumbar Epidural Steroid (., Back) (01/09/2014)  Injection of anesthetic into spinal canal for analgesia (01/09/2014)  Injection of other agent into spinal canal (01/09/2014)  Injection of steroid (01/09/2014)  Injection(s), anesthetic agent and/or steroid,  transforaminal epidural, with imaging guidance (fluoroscopy or CT); lumbar or sacral, single level. (01/09/2014)  Other x-ray of lumbosacral spine (01/09/2014)  Closed [transurethral] biopsy of bladder (07/25/2013)  Cystourethroscopy, with biopsy(s). (07/25/2013)  Carpal tunnel release  Hysterectomy  rooster cone injections X2 right knee   Medications  Alpha Lipoic, 100 mg, Oral, BID  amLODIPine 10 mg oral tablet, 10 mg= 1 tab(s), Oral, Daily  Aspir 81 oral delayed release tablet, 81 mg= 1 tab(s), Oral, Daily  Blood glucose monitor, See Instructions  carvedilol 12.5 mg oral tablet, See Instructions, 1 refills  clopidogrel 75 mg oral tablet, See Instructions, 1 refills  dicyclomine 20 mg oral tablet, 20 mg= 1 tab(s), Oral, QID  furosemide 20 mg oral tablet, See Instructions, 1 refills  Glucose Test Strips, See Instructions, 11 refills  hydrochlorothiazide-losartan 12.5 mg-100 mg oral tablet, 1 tab(s), Oral, Daily  Lancets, See Instructions, 11 refills  metformin 850 mg oral tablet, See Instructions, 1 refills  Pantoprazole 40 mg ORAL EC-Tablet, 40 mg= 1 tab(s), Oral, Daily, 5 refills  POTASSIUM CHLORIDE ER 20 MEQ TBCR, 20 mEq= 1 tab(s), Oral, Daily  ranitidine 150 mg oral tablet, See Instructions, 1 refills  RIVASTIGMINE TARTRATE 4.5 MG CAPS, 4.5 mg= 1 cap(s), Oral, BID  rosuvastatin 20 mg oral tablet, See Instructions, 1 refills  SUCRALFATE 1 GM TABS, 1 gm= 1 tab(s), Oral, QID  tamsulosin 0.4 mg oral capsule, 0.4 mg= 1 cap(s), Oral, Daily, 5 refills  TOLTERODINE TARTRATE ER 4 MG CP24, 4 mg= 1 cap(s), Daily  Toradol for IM, 60 mg= 2 mL, IM, Once  Vitamin B-12, 1000 mcg, Oral, Daily  Vitamin D3 5000 intl units oral capsule, 1 tab(s), Oral, Daily  Allergies  Lyrica?(fatigue)  Social History  Alcohol - Denies Alcohol Use, 07/18/2013  Never, 11/05/2015  Employment/School  Employed, 11/05/2015  Retired, Highest education level: High school., 07/18/2013  Exercise  Exercise duration: 0., 11/05/2015  Home/Environment  Lives  with Children. Living situation: Home with assistance., 01/05/2017  Lives with Alone., 11/05/2015  Nutrition/Health  Type of diet: Regular Diet. Regular, 11/05/2015  Sexual  Sexually active: No., 11/05/2015  Substance Abuse - Denies Substance Abuse, 07/18/2013  Never, 11/05/2015  Tobacco - Denies Tobacco Use, 01/20/2013  Never (less than 100 in lifetime), No, 04/23/2019  Never (less than 100 in lifetime), N/A, 02/13/2019  Never smoker, 11/05/2015  Family History  Cardiac arrest.: Brother.  Immunizations  Vaccine Date Status   influenza virus vaccine, inactivated 11/05/2015 Given   pneumococcal 13-valent conjugate vaccine 11/05/2015 Given   Health Maintenance  Health Maintenance  ???Pending?(in the next year)  ??? ??OverDue  ??? ? ? ?Coronary Artery Disease Maintenance-Antiplatelet Agent Prescribed due??and every?  ??? ? ? ?Coronary Artery Disease Maintenance-Lipid Lowering Therapy due??and every?  ??? ? ? ?Pneumococcal Vaccine due??and every?  ??? ? ? ?Bone Density Screening due??05/26/18??and every 2??year(s)  ??? ? ? ?Hypertension Management-Education due??08/31/18??and every 1??year(s)  ??? ? ? ?Smoking Cessation due??08/31/18??and every 1??year(s)  ??? ??Due?  ??? ? ? ?Breast Cancer Screening (Senior Wellness) due??03/28/19??and every 2??year(s)  ??? ? ? ?ADL Screening due??04/23/19??and every 1??year(s)  ??? ? ? ?Cognitive Screening due??04/23/19??and every 1??year(s)  ??? ? ? ?Geriatric Depression Screening due??04/23/19??and every 1??year(s)  ??? ? ? ?Tetanus Vaccine due??04/23/19??and every 10??year(s)  ??? ??Due In Future?  ??? ? ? ?Aspirin Therapy for CVD Prevention not due until??06/28/19??and every 1??year(s)  ??? ? ? ?Advance Directive not due until??06/28/19??and every 1??year(s)  ??? ? ? ?Alcohol Misuse Screening not due until??06/28/19??and every 1??year(s)  ??? ? ? ?Functional Assessment not due until??06/28/19??and every 1??year(s)  ??? ? ? ?Diabetes Maintenance-Fasting Lipid Profile not due  until??07/09/19??and every 1??year(s)  ??? ? ? ?Diabetes Maintenance-Foot Exam not due until??01/03/20??and every 1??year(s)  ??? ? ? ?Diabetes Maintenance-HgbA1c not due until??01/03/20??and every 1??year(s)  ??? ? ? ?Hypertension Management-BMP not due until??02/13/20??and every 1??year(s)  ??? ? ? ?Fall Risk Assessment not due until??02/13/20??and every 1??year(s)  ??? ? ? ?Diabetes Maintenance-Eye Exam not due until??02/19/20??and every 1??year(s)  ??? ? ? ?Hypertension Management-Blood Pressure not due until??04/22/20??and every 1??year(s)  ???Satisfied?(in the past 1 year)  ??? ??Satisfied?  ??? ? ? ?Advance Directive on??06/28/18.??Satisfied by Pearl Godoy LPN  ??? ? ? ?Alcohol Misuse Screening on??06/28/18.??Satisfied by Pearl Godoy LPN  ??? ? ? ?Aspirin Therapy for CVD Prevention on??06/28/18.??Satisfied by Pearl Godoy LPN??Reason: Expectation Satisfied Elsewhere  ??? ? ? ?Blood Pressure Screening on??04/23/19.??Satisfied by Bernice Correa  ??? ? ? ?Body Mass Index Check on??04/23/19.??Satisfied by Bernice Correa  ??? ? ? ?Coronary Artery Disease Maintenance-Antiplatelet Agent Prescribed on??12/27/18.??Satisfied by Michelle Segundo MD  ??? ? ? ?Depression Screening on??01/03/19.??Satisfied by Pearl Godoy LPN  ??? ? ? ?Diabetes Maintenance-Eye Exam on??02/19/19.??Satisfied by Michelle Segundo MD  ??? ? ? ?Diabetes Screening on??02/13/19.??Satisfied by Daniel Ratliff  ??? ? ? ?Fall Risk Assessment on??02/13/19.??Satisfied by Sera Perez RN  ??? ? ? ?Functional Assessment on??06/28/18.??Satisfied by Pearl Godoy LPN  ??? ? ? ?Hypertension Management-Blood Pressure on??04/23/19.??Satisfied by Bernice Correa  ??? ? ? ?Lipid Screening on??07/09/18.??Satisfied by Magdy Bahena  ??? ? ? ?Obesity Screening on??04/23/19.??Satisfied by Bernice Correa  ?  ?

## 2022-07-05 ENCOUNTER — PATIENT OUTREACH (OUTPATIENT)
Dept: ADMINISTRATIVE | Facility: HOSPITAL | Age: 77
End: 2022-07-05
Payer: MEDICARE

## 2022-07-05 NOTE — PROGRESS NOTES
Population Health. Out Reach.  The following record(s)  below were uploaded for Health Maintenance .    9/8/21 MAMMOGRAM SCREENING            3/23/21 COLONOSCOPY

## 2022-07-24 PROBLEM — G47.33 OBSTRUCTIVE SLEEP APNEA SYNDROME: Chronic | Status: ACTIVE | Noted: 2018-10-08

## 2022-07-24 PROBLEM — I24.0: Chronic | Status: ACTIVE | Noted: 2022-07-24

## 2022-07-24 PROBLEM — I10 HYPERTENSION: Chronic | Status: ACTIVE | Noted: 2022-07-24

## 2022-07-24 PROBLEM — E55.9 VITAMIN D DEFICIENCY: Chronic | Status: ACTIVE | Noted: 2022-07-24

## 2022-07-24 PROBLEM — E78.5 HYPERLIPIDEMIA: Status: ACTIVE | Noted: 2022-07-24

## 2022-07-24 PROBLEM — M19.049 OSTEOARTHRITIS OF HAND: Chronic | Status: ACTIVE | Noted: 2022-07-24

## 2022-07-24 PROBLEM — I24.0: Status: ACTIVE | Noted: 2022-07-24

## 2022-07-24 PROBLEM — E66.9 OBESITY: Status: ACTIVE | Noted: 2022-07-24

## 2022-07-24 PROBLEM — G62.9 NEUROPATHY: Status: ACTIVE | Noted: 2022-07-24

## 2022-07-24 PROBLEM — E78.2 MIXED HYPERLIPIDEMIA: Status: ACTIVE | Noted: 2022-07-24

## 2022-07-24 PROBLEM — I87.2 PERIPHERAL VENOUS INSUFFICIENCY: Status: ACTIVE | Noted: 2022-07-24

## 2022-07-24 PROBLEM — I87.2 PERIPHERAL VENOUS INSUFFICIENCY: Chronic | Status: ACTIVE | Noted: 2022-07-24

## 2022-07-24 PROBLEM — E78.2 MIXED HYPERLIPIDEMIA: Chronic | Status: ACTIVE | Noted: 2022-07-24

## 2022-07-24 PROBLEM — I10 HYPERTENSION: Status: ACTIVE | Noted: 2022-07-24

## 2022-07-24 PROBLEM — E66.9 OBESITY: Chronic | Status: ACTIVE | Noted: 2022-07-24

## 2022-07-24 PROBLEM — G47.33 OBSTRUCTIVE SLEEP APNEA SYNDROME: Status: ACTIVE | Noted: 2018-10-08

## 2022-07-24 PROBLEM — G62.9 NEUROPATHY: Chronic | Status: ACTIVE | Noted: 2022-07-24

## 2022-07-24 PROBLEM — E78.5 HYPERLIPIDEMIA: Chronic | Status: ACTIVE | Noted: 2022-07-24

## 2022-07-24 PROBLEM — E55.9 VITAMIN D DEFICIENCY: Status: ACTIVE | Noted: 2022-07-24

## 2022-07-24 PROBLEM — M19.049 OSTEOARTHRITIS OF HAND: Status: ACTIVE | Noted: 2022-07-24

## 2022-07-24 NOTE — PROGRESS NOTES
Subjective:       Patient ID: Emily Oshea is a 76 y.o. female.    Chief Complaint: No chief complaint on file.    HPI   Patient presents to the clinic today for chronic condition follow-up.  Doing well, we will recheck an A1c today, last A1c 3 months ago 8.1%, that was an improvement from the previous A1c 6 months before, over 10%.  She is having some mild dyspnea on exertion, no chest pain, she does have a cardiologist, she does plan on contacting her cardiologist fairly soon, she is with her daughter who will make sure of that.    Last wellness exam was 10/11/2021.      Review of Systems   Constitutional: Negative.  Negative for fatigue and fever.   Respiratory: Negative.  Negative for shortness of breath.    Cardiovascular: Negative.  Negative for chest pain.   Gastrointestinal: Negative.  Negative for abdominal pain, change in bowel habit and change in bowel habit.   Integumentary:  Negative.   Neurological: Negative for weakness.         Objective:   There were no vitals filed for this visit.There is no height or weight on file to calculate BMI.     Physical Exam  Constitutional:       Appearance: Normal appearance.   Eyes:      Conjunctiva/sclera: Conjunctivae normal.   Cardiovascular:      Rate and Rhythm: Normal rate and regular rhythm.   Pulmonary:      Effort: Pulmonary effort is normal.      Breath sounds: Normal breath sounds.   Skin:     General: Skin is warm and dry.   Neurological:      Mental Status: She is alert.   Psychiatric:         Mood and Affect: Mood normal.         Behavior: Behavior normal.         Diabetic foot exam:  Examination performed by myself, Dr. Martins    Examination of the skin/nails:  Bilateral examination of the feet reveals no significant skin lesions/abnormalities, ulcerations, ulcers, nail abnormalities.  Patient does appear to be using appropriate footwear.    Peripheral pulses:  Dorsalis pedis pulse-normal bilaterally  Posterior tibialis pulse-normal  bilaterally  Appropriate amount of foot hair, capillary refill normal    Five point sensation test:  Left foot sensation test-no significant loss of sensation at all points tested  Right foot to cessation test-no significant loss of sensation at all points tested    Foot exam risk category:  0-2 (none or minimal loss of protective sensation with no additional findings)    Foot exam intervention:  Foot care education done at appropriate risk level  Lab Results   Component Value Date     10/11/2021    K 4.4 10/11/2021    CO2 31 10/11/2021    BUN 16.4 10/11/2021    CREATININE 0.99 10/11/2021    EGFRNONAA 58 10/11/2021    AST 22 10/11/2021    ALT 32 10/11/2021    CHOL 165 10/11/2021    LDL 94.00 10/11/2021    TRIG 180 (H) 10/11/2021    HDL 35 10/11/2021    TSH 1.1748 10/11/2021    HGBA1C 8.1 04/19/2022    WBC 7.6 10/11/2021    RBC 4.45 10/11/2021    HGB 13.1 10/11/2021    HCT 43.0 10/11/2021    MCV 96.6 (H) 10/11/2021     10/11/2021      Assessment:     Problem List Items Addressed This Visit        Cardiac/Vascular    Occlusion of coronary artery (Chronic)    Overview     pt states had angiogram, but no intervention was done           Relevant Orders    CBC Auto Differential    Comprehensive Metabolic Panel    Hemoglobin A1C    Hepatitis C Antibody    Lipid Panel    TSH    Vitamin D    Primary hypertension - Primary (Chronic)    Current Assessment & Plan     Blood pressures appear to be adequately controlled with current treatment plan, continue medical management and continue home blood pressure checks.  Blood pressure should be checked as discussed during medical visits, and average blood pressure should be no greater than 130/80.           Relevant Orders    CBC Auto Differential    Comprehensive Metabolic Panel    Hemoglobin A1C    Hepatitis C Antibody    Lipid Panel    TSH    Vitamin D    Mixed hyperlipidemia (Chronic)    Current Assessment & Plan     Lipid panel will be checked at next visit,  wellness in 6 months.           Relevant Orders    Lipid Panel    Dyspnea on exertion (Chronic)    Current Assessment & Plan     Having mild MCKEE, she does have a cardiologist, she is with her daughter, they will contact the cardiologist for an evaluation.  She has not had any significant chest pain              Endocrine    Vitamin D deficiency (Chronic)    Relevant Orders    Vitamin D    Type 2 diabetes mellitus with hyperglycemia, without long-term current use of insulin (Chronic)    Current Assessment & Plan     Last A1c 3 months ago 8.1%, we will recheck today.           Relevant Orders    Hemoglobin A1C    Hemoglobin A1C      Other Visit Diagnoses     Wellness examination        Wellness exam with pre visit labs scheduled for 6 months.    Relevant Orders    CBC Auto Differential    Comprehensive Metabolic Panel    Hemoglobin A1C    Hepatitis C Antibody    Lipid Panel    TSH    Vitamin D               Plan:           All acute and chronic conditions addressed and discussed.  Appropriate medical follow-up scheduled, including pertinent lab testing if necessary.  All questions answered, patient voiced understanding of all medical problems.    Follow up in about 6 months (around 1/26/2023) for Wellness, DM recheck.

## 2022-07-26 ENCOUNTER — OFFICE VISIT (OUTPATIENT)
Dept: PRIMARY CARE CLINIC | Facility: CLINIC | Age: 77
End: 2022-07-26
Payer: MEDICARE

## 2022-07-26 VITALS
TEMPERATURE: 98 F | DIASTOLIC BLOOD PRESSURE: 85 MMHG | OXYGEN SATURATION: 99 % | HEIGHT: 61 IN | WEIGHT: 237 LBS | RESPIRATION RATE: 18 BRPM | HEART RATE: 73 BPM | SYSTOLIC BLOOD PRESSURE: 121 MMHG | BODY MASS INDEX: 44.75 KG/M2

## 2022-07-26 DIAGNOSIS — E11.65 TYPE 2 DIABETES MELLITUS WITH HYPERGLYCEMIA, WITHOUT LONG-TERM CURRENT USE OF INSULIN: Chronic | ICD-10-CM

## 2022-07-26 DIAGNOSIS — E55.9 VITAMIN D DEFICIENCY: Chronic | ICD-10-CM

## 2022-07-26 DIAGNOSIS — E78.2 MIXED HYPERLIPIDEMIA: Chronic | ICD-10-CM

## 2022-07-26 DIAGNOSIS — Z00.00 WELLNESS EXAMINATION: ICD-10-CM

## 2022-07-26 DIAGNOSIS — I10 PRIMARY HYPERTENSION: Primary | Chronic | ICD-10-CM

## 2022-07-26 DIAGNOSIS — R06.09 DYSPNEA ON EXERTION: ICD-10-CM

## 2022-07-26 DIAGNOSIS — I24.0 OCCLUSION OF CORONARY ARTERY: Chronic | ICD-10-CM

## 2022-07-26 LAB
EST. AVERAGE GLUCOSE BLD GHB EST-MCNC: 177.2 MG/DL
HBA1C MFR BLD: 7.8 %

## 2022-07-26 PROCEDURE — 4010F ACE/ARB THERAPY RXD/TAKEN: CPT | Mod: CPTII,,, | Performed by: GENERAL PRACTICE

## 2022-07-26 PROCEDURE — 83036 HEMOGLOBIN GLYCOSYLATED A1C: CPT | Performed by: GENERAL PRACTICE

## 2022-07-26 PROCEDURE — 4010F PR ACE/ARB THEARPY RXD/TAKEN: ICD-10-PCS | Mod: CPTII,,, | Performed by: GENERAL PRACTICE

## 2022-07-26 PROCEDURE — 99214 PR OFFICE/OUTPT VISIT, EST, LEVL IV, 30-39 MIN: ICD-10-PCS | Mod: ,,, | Performed by: GENERAL PRACTICE

## 2022-07-26 PROCEDURE — 99214 OFFICE O/P EST MOD 30 MIN: CPT | Mod: ,,, | Performed by: GENERAL PRACTICE

## 2022-07-26 PROCEDURE — 36415 COLL VENOUS BLD VENIPUNCTURE: CPT | Performed by: GENERAL PRACTICE

## 2022-07-26 RX ORDER — CARVEDILOL 25 MG/1
25 TABLET ORAL
COMMUNITY
Start: 2021-10-30 | End: 2022-09-27

## 2022-07-26 RX ORDER — TORSEMIDE 20 MG/1
20 TABLET ORAL
COMMUNITY
Start: 2021-10-30 | End: 2022-09-27

## 2022-07-26 RX ORDER — LOSARTAN POTASSIUM 100 MG/1
100 TABLET ORAL
COMMUNITY
Start: 2021-10-30 | End: 2022-09-27

## 2022-07-26 RX ORDER — MELOXICAM 15 MG/1
TABLET ORAL
COMMUNITY
End: 2023-04-06

## 2022-07-26 RX ORDER — BENAZEPRIL HYDROCHLORIDE 10 MG/1
TABLET ORAL
COMMUNITY
End: 2023-04-06

## 2022-07-26 RX ORDER — METFORMIN HYDROCHLORIDE 850 MG/1
TABLET ORAL
COMMUNITY
Start: 2021-10-30 | End: 2022-09-27

## 2022-07-26 RX ORDER — GLIPIZIDE 10 MG/1
10 TABLET, FILM COATED, EXTENDED RELEASE ORAL
COMMUNITY
Start: 2022-01-27 | End: 2022-08-26 | Stop reason: SDUPTHER

## 2022-07-26 RX ORDER — TRIAMCINOLONE ACETONIDE 5 MG/G
CREAM TOPICAL
COMMUNITY
Start: 2022-06-07

## 2022-07-26 RX ORDER — ACETAMINOPHEN 500 MG
5000 TABLET ORAL
COMMUNITY

## 2022-07-26 RX ORDER — POTASSIUM CHLORIDE 1500 MG/1
20 TABLET, EXTENDED RELEASE ORAL
COMMUNITY
Start: 2021-10-30 | End: 2023-04-06

## 2022-07-26 RX ORDER — GLUCOSAMINE HCL 500 MG
200 TABLET ORAL DAILY
COMMUNITY
End: 2022-09-27

## 2022-07-26 RX ORDER — HYDROXYZINE HYDROCHLORIDE 25 MG/1
TABLET, FILM COATED ORAL
COMMUNITY
Start: 2022-06-07 | End: 2023-04-06

## 2022-07-26 NOTE — ASSESSMENT & PLAN NOTE
Having mild MCKEE, she does have a cardiologist, she is with her daughter, they will contact the cardiologist for an evaluation.  She has not had any significant chest pain

## 2022-07-27 ENCOUNTER — TELEPHONE (OUTPATIENT)
Dept: FAMILY MEDICINE | Facility: CLINIC | Age: 77
End: 2022-07-27
Payer: MEDICARE

## 2022-07-27 NOTE — TELEPHONE ENCOUNTER
----- Message from Valente Martins MD sent at 7/27/2022  8:25 AM CDT -----  Hemoglobin A1c yesterday was 7.8%, this is what we were shooting for, below 8%, continue current treatment plan, great news.

## 2022-07-28 ENCOUNTER — TELEPHONE (OUTPATIENT)
Dept: PRIMARY CARE CLINIC | Facility: CLINIC | Age: 77
End: 2022-07-28
Payer: MEDICARE

## 2022-08-26 ENCOUNTER — TELEPHONE (OUTPATIENT)
Dept: PRIMARY CARE CLINIC | Facility: CLINIC | Age: 77
End: 2022-08-26
Payer: MEDICARE

## 2022-08-26 DIAGNOSIS — E11.65 TYPE 2 DIABETES MELLITUS WITH HYPERGLYCEMIA, WITHOUT LONG-TERM CURRENT USE OF INSULIN: Primary | ICD-10-CM

## 2022-08-26 RX ORDER — GLIPIZIDE 10 MG/1
10 TABLET, FILM COATED, EXTENDED RELEASE ORAL
Qty: 90 TABLET | Refills: 3 | Status: SHIPPED | OUTPATIENT
Start: 2022-08-26 | End: 2023-10-23 | Stop reason: SDUPTHER

## 2022-08-26 NOTE — TELEPHONE ENCOUNTER
----- Message from Peg Nance sent at 8/26/2022 10:09 AM CDT -----  Regarding: refill  Type:  RX Refill Request    Who Called: pt   Refill or New Rx:refill  RX Name and Strength:glipiZIDE (GLUCOTROL) 10 MG TR24  How is the patient currently taking it? (ex. 1XDay):1 day  Is this a 30 day or 90 day RX:90  [facility] or [facility]:mail order  Ordering Provider:angelo vu  Would the patient rather a call back or a response via [facility]? C/b  Best Call Back Number:[phone]  Additional Information: pt is completely out  please refill as soon as possible

## 2022-09-24 DIAGNOSIS — I87.2 PERIPHERAL VENOUS INSUFFICIENCY: Chronic | ICD-10-CM

## 2022-09-24 DIAGNOSIS — I10 PRIMARY HYPERTENSION: Primary | Chronic | ICD-10-CM

## 2022-09-24 DIAGNOSIS — E83.42 HYPOMAGNESEMIA: ICD-10-CM

## 2022-09-24 DIAGNOSIS — R32 URINARY INCONTINENCE, UNSPECIFIED TYPE: ICD-10-CM

## 2022-09-24 DIAGNOSIS — E78.2 MIXED HYPERLIPIDEMIA: Chronic | ICD-10-CM

## 2022-09-24 DIAGNOSIS — E11.65 TYPE 2 DIABETES MELLITUS WITH HYPERGLYCEMIA, WITHOUT LONG-TERM CURRENT USE OF INSULIN: Chronic | ICD-10-CM

## 2022-09-24 DIAGNOSIS — K21.9 GASTROESOPHAGEAL REFLUX DISEASE, UNSPECIFIED WHETHER ESOPHAGITIS PRESENT: ICD-10-CM

## 2022-09-24 DIAGNOSIS — Z86.39 HISTORY OF LOW POTASSIUM: ICD-10-CM

## 2022-09-27 RX ORDER — TAMSULOSIN HYDROCHLORIDE 0.4 MG/1
0.4 CAPSULE ORAL DAILY
Qty: 90 CAPSULE | Refills: 3 | Status: SHIPPED | OUTPATIENT
Start: 2022-09-27 | End: 2023-04-06

## 2022-09-27 RX ORDER — ROSUVASTATIN CALCIUM 20 MG/1
TABLET, COATED ORAL
COMMUNITY
Start: 2021-10-30 | End: 2023-10-23 | Stop reason: SDUPTHER

## 2022-09-27 RX ORDER — CARVEDILOL 25 MG/1
25 TABLET ORAL 2 TIMES DAILY
Qty: 180 TABLET | Refills: 3 | Status: SHIPPED | OUTPATIENT
Start: 2022-09-27 | End: 2023-09-27

## 2022-09-27 RX ORDER — METFORMIN HYDROCHLORIDE 850 MG/1
850 TABLET ORAL 2 TIMES DAILY
Qty: 180 TABLET | Refills: 3 | Status: SHIPPED | OUTPATIENT
Start: 2022-09-27 | End: 2023-09-27

## 2022-09-27 RX ORDER — OMEPRAZOLE 40 MG/1
40 CAPSULE, DELAYED RELEASE ORAL
COMMUNITY
Start: 2021-10-30

## 2022-09-27 RX ORDER — LANOLIN ALCOHOL/MO/W.PET/CERES
400 CREAM (GRAM) TOPICAL DAILY
Qty: 90 TABLET | Refills: 3 | Status: SHIPPED | OUTPATIENT
Start: 2022-09-27 | End: 2022-09-28 | Stop reason: SDUPTHER

## 2022-09-27 RX ORDER — OMEPRAZOLE 40 MG/1
40 CAPSULE, DELAYED RELEASE ORAL DAILY
Qty: 90 CAPSULE | Refills: 3 | Status: SHIPPED | OUTPATIENT
Start: 2022-09-27 | End: 2023-04-06

## 2022-09-27 RX ORDER — ROSUVASTATIN CALCIUM 20 MG/1
20 TABLET, COATED ORAL DAILY
Qty: 90 TABLET | Refills: 3 | Status: SHIPPED | OUTPATIENT
Start: 2022-09-27 | End: 2023-04-06

## 2022-09-27 RX ORDER — POTASSIUM CHLORIDE 20 MEQ/1
20 TABLET, EXTENDED RELEASE ORAL DAILY
Qty: 90 TABLET | Refills: 3 | Status: SHIPPED | OUTPATIENT
Start: 2022-09-27 | End: 2023-09-27

## 2022-09-27 RX ORDER — TAMSULOSIN HYDROCHLORIDE 0.4 MG/1
0.4 CAPSULE ORAL
COMMUNITY
Start: 2021-10-30

## 2022-09-27 RX ORDER — LOSARTAN POTASSIUM 100 MG/1
100 TABLET ORAL DAILY
Qty: 90 TABLET | Refills: 3 | Status: SHIPPED | OUTPATIENT
Start: 2022-09-27 | End: 2023-10-24 | Stop reason: SDUPTHER

## 2022-09-27 RX ORDER — TORSEMIDE 20 MG/1
20 TABLET ORAL DAILY
Qty: 90 TABLET | Refills: 3 | Status: SHIPPED | OUTPATIENT
Start: 2022-09-27 | End: 2022-10-14 | Stop reason: SDUPTHER

## 2022-09-27 RX ORDER — RIVASTIGMINE TARTRATE 6 MG/1
CAPSULE ORAL
COMMUNITY
Start: 2022-05-11 | End: 2023-11-22 | Stop reason: SDUPTHER

## 2022-09-28 ENCOUNTER — TELEPHONE (OUTPATIENT)
Dept: PRIMARY CARE CLINIC | Facility: CLINIC | Age: 77
End: 2022-09-28
Payer: MEDICARE

## 2022-09-28 DIAGNOSIS — E83.42 HYPOMAGNESEMIA: ICD-10-CM

## 2022-09-28 RX ORDER — LANOLIN ALCOHOL/MO/W.PET/CERES
400 CREAM (GRAM) TOPICAL DAILY
Qty: 90 TABLET | Refills: 3 | Status: SHIPPED | OUTPATIENT
Start: 2022-09-28 | End: 2023-09-28

## 2022-09-28 NOTE — TELEPHONE ENCOUNTER
----- Message from Amina Landry sent at 9/28/2022  4:02 PM CDT -----  Regarding: med refill  .Type:  RX Refill Request    Who Called: pt   Refill or New Rx:refill   RX Name and Strength:magnesium oxide (MAG-OX) 400 mg (241.3 mg magnesium) tablet  How is the patient currently taking it? (ex. 1XDay):1xday   Is this a 30 day or 90 day RX:90  Preferred Pharmacy with phone number:[facility] - [facility] [address]  Local or Mail Order:mail order   Ordering Provider:Eliezer  Would the patient rather a call back or a response via [facility]? Call back   Best Call Back Number:[phone]  Additional Information:

## 2022-10-14 ENCOUNTER — TELEPHONE (OUTPATIENT)
Dept: PRIMARY CARE CLINIC | Facility: CLINIC | Age: 77
End: 2022-10-14
Payer: MEDICARE

## 2022-10-14 DIAGNOSIS — R32 URINARY INCONTINENCE, UNSPECIFIED TYPE: ICD-10-CM

## 2022-10-14 RX ORDER — TORSEMIDE 20 MG/1
20 TABLET ORAL 2 TIMES DAILY
Qty: 180 TABLET | Refills: 3 | Status: SHIPPED | OUTPATIENT
Start: 2022-10-14 | End: 2023-10-14

## 2022-10-14 NOTE — TELEPHONE ENCOUNTER
----- Message from Jevon Parikh sent at 10/14/2022 10:43 AM CDT -----  Regarding: med refill  .Type:  RX Refill Request    Who Called: pt  Refill or New Rx:refill  RX Name and Strength: torsemide (DEMADEX) 20 MG Tab  How is the patient currently taking it? (ex. 1XDay):2x   Is this a 30 day or 90 day RX:90  Preferred Pharmacy with phone number:[facility] mail order  Local or Mail Order:mail order  Ordering Provider:Eliezer  Would the patient rather a call back or a response via [facility]? Call back   Best Call Back Number:[phone]  Additional Information: Pt states that she is supposed to be taking the med 2x daily instead of 1x daily and would like the second hinojosa of prescription.

## 2023-02-07 ENCOUNTER — TELEPHONE (OUTPATIENT)
Dept: PRIMARY CARE CLINIC | Facility: CLINIC | Age: 78
End: 2023-02-07
Payer: MEDICARE

## 2023-02-07 NOTE — TELEPHONE ENCOUNTER
----- Message from Nidia Paniagua LPN sent at 2/7/2023  9:19 AM CST -----  Regarding: FW: nurse visit  Can  you please schedule pt for nurse visit please   ----- Message -----  From: Yuliet Sotomayor  Sent: 2/7/2023   9:01 AM CST  To: [facility] Victor Staff  Subject: nurse visit                                      Pt called wanting to do her lab work in office with the nurse for the week of the 20th-24th  [phone]

## 2023-02-22 ENCOUNTER — CLINICAL SUPPORT (OUTPATIENT)
Dept: PRIMARY CARE CLINIC | Facility: CLINIC | Age: 78
End: 2023-02-22
Payer: MEDICARE

## 2023-02-22 DIAGNOSIS — Z00.00 WELLNESS EXAMINATION: ICD-10-CM

## 2023-02-22 DIAGNOSIS — I10 PRIMARY HYPERTENSION: ICD-10-CM

## 2023-02-22 DIAGNOSIS — E11.65 TYPE 2 DIABETES MELLITUS WITH HYPERGLYCEMIA, WITHOUT LONG-TERM CURRENT USE OF INSULIN: Chronic | ICD-10-CM

## 2023-02-22 DIAGNOSIS — Z00.00 ENCOUNTER FOR WELLNESS EXAMINATION: Primary | ICD-10-CM

## 2023-02-22 DIAGNOSIS — E78.2 MIXED HYPERLIPIDEMIA: Chronic | ICD-10-CM

## 2023-02-22 DIAGNOSIS — E55.9 VITAMIN D DEFICIENCY: Chronic | ICD-10-CM

## 2023-02-22 DIAGNOSIS — I24.0 OCCLUSION OF CORONARY ARTERY: ICD-10-CM

## 2023-02-22 LAB
ALBUMIN SERPL-MCNC: 3.5 G/DL (ref 3.4–4.8)
ALBUMIN/GLOB SERPL: 1.1 RATIO (ref 1.1–2)
ALP SERPL-CCNC: 97 UNIT/L (ref 40–150)
ALT SERPL-CCNC: 21 UNIT/L (ref 0–55)
AST SERPL-CCNC: 17 UNIT/L (ref 5–34)
BASOPHILS # BLD AUTO: 0.06 X10(3)/MCL (ref 0–0.2)
BASOPHILS NFR BLD AUTO: 0.7 %
BILIRUBIN DIRECT+TOT PNL SERPL-MCNC: 0.2 MG/DL
BUN SERPL-MCNC: 14.8 MG/DL (ref 9.8–20.1)
CALCIUM SERPL-MCNC: 9.6 MG/DL (ref 8.4–10.2)
CHLORIDE SERPL-SCNC: 104 MMOL/L (ref 98–107)
CHOLEST SERPL-MCNC: 206 MG/DL
CHOLEST/HDLC SERPL: 5 {RATIO} (ref 0–5)
CO2 SERPL-SCNC: 32 MMOL/L (ref 23–31)
CREAT SERPL-MCNC: 1.11 MG/DL (ref 0.55–1.02)
DEPRECATED CALCIDIOL+CALCIFEROL SERPL-MC: 37.1 NG/ML (ref 30–80)
EOSINOPHIL # BLD AUTO: 0.26 X10(3)/MCL (ref 0–0.9)
EOSINOPHIL NFR BLD AUTO: 2.9 %
ERYTHROCYTE [DISTWIDTH] IN BLOOD BY AUTOMATED COUNT: 12.7 % (ref 11.5–17)
EST. AVERAGE GLUCOSE BLD GHB EST-MCNC: 174.3 MG/DL
GFR SERPLBLD CREATININE-BSD FMLA CKD-EPI: 52 MLS/MIN/1.73/M2
GLOBULIN SER-MCNC: 3.1 GM/DL (ref 2.4–3.5)
GLUCOSE SERPL-MCNC: 133 MG/DL (ref 82–115)
HBA1C MFR BLD: 7.7 %
HCT VFR BLD AUTO: 39.5 % (ref 37–47)
HDLC SERPL-MCNC: 38 MG/DL (ref 35–60)
HGB BLD-MCNC: 12.3 G/DL (ref 12–16)
IMM GRANULOCYTES # BLD AUTO: 0.03 X10(3)/MCL (ref 0–0.04)
IMM GRANULOCYTES NFR BLD AUTO: 0.3 %
LDLC SERPL CALC-MCNC: 126 MG/DL (ref 50–140)
LYMPHOCYTES # BLD AUTO: 2.56 X10(3)/MCL (ref 0.6–4.6)
LYMPHOCYTES NFR BLD AUTO: 28.5 %
MCH RBC QN AUTO: 29.9 PG
MCHC RBC AUTO-ENTMCNC: 31.1 G/DL (ref 33–36)
MCV RBC AUTO: 95.9 FL (ref 80–94)
MONOCYTES # BLD AUTO: 0.67 X10(3)/MCL (ref 0.1–1.3)
MONOCYTES NFR BLD AUTO: 7.5 %
NEUTROPHILS # BLD AUTO: 5.4 X10(3)/MCL (ref 2.1–9.2)
NEUTROPHILS NFR BLD AUTO: 60.1 %
NRBC BLD AUTO-RTO: 0 %
PLATELET # BLD AUTO: 314 X10(3)/MCL (ref 130–400)
PMV BLD AUTO: 10.6 FL (ref 7.4–10.4)
POTASSIUM SERPL-SCNC: 4.4 MMOL/L (ref 3.5–5.1)
PROT SERPL-MCNC: 6.6 GM/DL (ref 5.8–7.6)
RBC # BLD AUTO: 4.12 X10(6)/MCL (ref 4.2–5.4)
SODIUM SERPL-SCNC: 141 MMOL/L (ref 136–145)
TRIGL SERPL-MCNC: 211 MG/DL (ref 37–140)
TSH SERPL-ACNC: 2.51 UIU/ML (ref 0.35–4.94)
VLDLC SERPL CALC-MCNC: 42 MG/DL
WBC # SPEC AUTO: 9 X10(3)/MCL (ref 4.5–11.5)

## 2023-02-22 PROCEDURE — 83036 HEMOGLOBIN GLYCOSYLATED A1C: CPT | Performed by: GENERAL PRACTICE

## 2023-02-22 PROCEDURE — 36415 COLL VENOUS BLD VENIPUNCTURE: CPT

## 2023-02-22 PROCEDURE — 80053 COMPREHEN METABOLIC PANEL: CPT | Performed by: GENERAL PRACTICE

## 2023-02-22 PROCEDURE — 80061 LIPID PANEL: CPT | Performed by: GENERAL PRACTICE

## 2023-02-22 PROCEDURE — 36415 COLL VENOUS BLD VENIPUNCTURE: CPT | Mod: ,,, | Performed by: GENERAL PRACTICE

## 2023-02-22 PROCEDURE — 82306 VITAMIN D 25 HYDROXY: CPT | Performed by: GENERAL PRACTICE

## 2023-02-22 PROCEDURE — 86803 HEPATITIS C AB TEST: CPT | Performed by: GENERAL PRACTICE

## 2023-02-22 PROCEDURE — 36415 PR COLLECTION VENOUS BLOOD,VENIPUNCTURE: ICD-10-PCS | Mod: ,,, | Performed by: GENERAL PRACTICE

## 2023-02-22 PROCEDURE — 84443 ASSAY THYROID STIM HORMONE: CPT | Performed by: GENERAL PRACTICE

## 2023-02-22 PROCEDURE — 85025 COMPLETE CBC W/AUTO DIFF WBC: CPT | Performed by: GENERAL PRACTICE

## 2023-02-22 NOTE — PROGRESS NOTES
Patient here to draw Wellness labs with 22 gauge needle. Patient was drawn in left arm .No complications or issues occurred. Patient expressed no pain.

## 2023-02-23 LAB — HCV AB SERPL QL IA: NONREACTIVE

## 2023-03-21 ENCOUNTER — PATIENT OUTREACH (OUTPATIENT)
Dept: ADMINISTRATIVE | Facility: HOSPITAL | Age: 78
End: 2023-03-21
Payer: MEDICARE

## 2023-03-21 ENCOUNTER — TELEPHONE (OUTPATIENT)
Dept: PRIMARY CARE CLINIC | Facility: CLINIC | Age: 78
End: 2023-03-21
Payer: MEDICARE

## 2023-03-21 NOTE — TELEPHONE ENCOUNTER
I spoke with patient regarding results. She spoke understanding and I will try to move her appointment from June to something sooner

## 2023-03-21 NOTE — LETTER
"  This communication is flagged as high priority.        AUTHORIZATION FOR RELEASE OF   CONFIDENTIAL INFORMATION    Dear Dr. Tinoco,    We are seeing Emily Oshea, date of birth 1945, in the clinic at [facility]. Valente Martins MD is the patient's PCP. Emily Oshea has an outstanding lab/procedure at the time we reviewed her chart. In order to help keep her health information updated, she has authorized us to request the following medical record(s):        (  )  MAMMOGRAM                                      (  )  COLONOSCOPY      (  )  PAP SMEAR                                          (  )  OUTSIDE LAB RESULTS     (  )  DEXA SCAN                                          (xx  )   DM EYE EXAM      ,      (  )  FOOT EXAM                                          (  )  ENTIRE RECORD     (  )  OUTSIDE IMMUNIZATIONS                 (  )  _______________         Please fax records to [facility], Pernell J Simon, MD, [phone]              If you have any questions, please contact Janell Salamanca (Connie)Care Coordinator @ [phone]         Patient Name: Emily Oshea  : 1945  Patient Phone #: [phone]     "

## 2023-03-21 NOTE — TELEPHONE ENCOUNTER
----- Message from Ila Polk sent at 3/21/2023  3:16 PM CDT -----  .Type:  Needs Medical Advice    Who Called: pt  Would the patient rather a call back or a response via [facility]? Call back  Best Call Back Number: [phone]  Additional Information:    Pt had labs drawn on 03/22/23 Pt would like results

## 2023-04-05 ENCOUNTER — DOCUMENTATION ONLY (OUTPATIENT)
Dept: PRIMARY CARE CLINIC | Facility: CLINIC | Age: 78
End: 2023-04-05
Payer: MEDICARE

## 2023-04-05 PROBLEM — M20.009 ACQUIRED DEFORMITY OF DISTAL INTERPHALANGEAL (DIP) JOINT OF FINGER DUE TO TRAUMA: Status: ACTIVE | Noted: 2023-04-05

## 2023-04-05 PROBLEM — E83.42 HYPOMAGNESEMIA: Status: ACTIVE | Noted: 2023-04-05

## 2023-04-05 PROBLEM — I73.9 PERIPHERAL VASCULAR DISEASE: Status: ACTIVE | Noted: 2023-04-05

## 2023-04-05 PROBLEM — E78.5 DYSLIPIDEMIA: Chronic | Status: ACTIVE | Noted: 2023-04-05

## 2023-04-05 PROBLEM — M54.12 CERVICAL RADICULOPATHY: Status: ACTIVE | Noted: 2023-04-05

## 2023-04-05 PROBLEM — E11.42 DIABETIC POLYNEUROPATHY: Status: ACTIVE | Noted: 2023-04-05

## 2023-04-05 PROBLEM — M50.30 DEGENERATION OF INTERVERTEBRAL DISC OF CERVICAL REGION: Status: ACTIVE | Noted: 2023-04-05

## 2023-04-05 PROBLEM — N18.31 STAGE 3A CHRONIC KIDNEY DISEASE (CKD): Chronic | Status: ACTIVE | Noted: 2023-04-05

## 2023-04-05 PROBLEM — R41.3 AMNESIA: Status: ACTIVE | Noted: 2023-04-05

## 2023-04-05 PROBLEM — R32 URINARY INCONTINENCE: Status: ACTIVE | Noted: 2023-04-05

## 2023-04-05 PROBLEM — R53.83 FATIGUE: Status: ACTIVE | Noted: 2023-04-05

## 2023-04-05 LAB
LEFT EYE DM RETINOPATHY: NEGATIVE
RIGHT EYE DM RETINOPATHY: NEGATIVE

## 2023-04-05 NOTE — ASSESSMENT & PLAN NOTE
Prescribed metformin 850 mg b.i.d..    Component Ref Range & Units 1 mo ago  (2/22/23) 8 mo ago  (7/26/22) 11 mo ago  (4/19/22) 1 yr ago  (10/11/21) 2 yr ago  (8/3/20) 3 yr ago  (7/2/19) 3 yr ago  (5/21/19)   Hemoglobin A1c <=7.0 % 7.7 High   7.8 High   8.1 R  10.3 High  R  7.5 High  R  6.8 High  R  7.2 High  R    Estimated Average Glucose mg/dL 174.3  177.2  185.8 R  248.9  168.6  148  160 High  R      Most recent hemoglobin A1c shows good control on diabetic treatment plan, including diabetic prescription medication.  Continue current treatment plan, medical regimen, and lifestyle modification as discussed at this visit and during previous visits.

## 2023-04-05 NOTE — ASSESSMENT & PLAN NOTE
Prescribed benazepril 10 mg daily, Coreg 25 mg b.i.d., losartan?  100 mg daily.  Blood pressures appear to be adequately controlled with current treatment plan, including prescription blood pressure medication. Continue medical management and continue home blood pressure checks.  Blood pressure should be checked as discussed during medical visits, and average blood pressure should be no greater than 130/80.

## 2023-04-05 NOTE — PROGRESS NOTES
Patient ID: [id number]     Chief Complaint: Annual Exam      HPI:     Emily Oshea is a 77 y.o. female here today for a Medicare Wellness. No other complaints today.       ----------------------------  Arthritis     Past Surgical History:   Procedure Laterality Date    COLONOSCOPY  02/23/2021       Review of patient's allergies indicates:   Allergen Reactions    Pregabalin      Other reaction(s): fatigue       Outpatient Medications Marked as Taking for the 4/6/23 encounter (Office Visit) with Valente Martins MD   Medication Sig Dispense Refill    aspirin (ECOTRIN) 81 MG EC tablet 1 tablet Orally Once a day for 30 day(s)      carvediloL (COREG) 25 MG tablet Take 1 tablet (25 mg total) by mouth 2 (two) times daily. 180 tablet 3    cholecalciferol, vitamin D3, 125 mcg (5,000 unit) Tab Take 5,000 Units by mouth.      glipiZIDE (GLUCOTROL) 10 MG TR24 Take 1 tablet (10 mg total) by mouth daily with breakfast. 90 tablet 3    losartan (COZAAR) 100 MG tablet Take 1 tablet (100 mg total) by mouth once daily. 90 tablet 3    magnesium oxide (MAG-OX) 400 mg (241.3 mg magnesium) tablet Take 1 tablet (400 mg total) by mouth once daily. 90 tablet 3    metFORMIN (GLUCOPHAGE) 850 MG tablet Take 1 tablet (850 mg total) by mouth 2 (two) times daily. 180 tablet 3    omeprazole (PRILOSEC) 40 MG capsule Take 40 mg by mouth.      potassium chloride SA (K-DUR,KLOR-CON) 20 MEQ tablet Take 1 tablet (20 mEq total) by mouth once daily. 90 tablet 3    rivastigmine tartrate (EXELON) 6 mg capsule       rosuvastatin (CRESTOR) 20 MG tablet   See Instructions, TAKE 1 TABLET BY MOUTH AT  BEDTIME, # 90 tab(s), 3 Refill(s), Pharmacy: [facility], 154, cm, Height/Length Dosing, 10/21/21 14:06:00 CDT, 103.3, kg, Weight Dosing, 10/21/21 14:06:00 CDT      tamsulosin (FLOMAX) 0.4 mg Cap Take 0.4 mg by mouth.      torsemide (DEMADEX) 20 MG Tab Take 1 tablet (20 mg total) by mouth 2 (two) times a day. 180 tablet 3    triamcinolone  "acetonide 0.5% (KENALOG) 0.5 % Crea APPLY 1 APPLICATION ON THE SKIN TWICE A DAY         Social History     Socioeconomic History    Marital status: Unknown   Tobacco Use    Smoking status: Never    Smokeless tobacco: Never        History reviewed. No pertinent family history.     Patient Care Team:  Valente Martins MD as PCP - General (Family Medicine)  Janell Salamanca LPN as Care Coordinator  Roxanna Tinoco MD as Consulting Physician (Ophthalmology)     Opioid Screening: Patient medication list reviewed, patient is not taking prescription opioids. Patient is not using additional opioids than prescribed. Patient is at low risk of substance abuse based on this opioid use history.       Subjective:     Review of Systems   Constitutional: Negative.  Negative for malaise/fatigue and weight loss.   HENT: Negative.     Eyes: Negative.    Respiratory: Negative.  Negative for shortness of breath.    Cardiovascular: Negative.  Negative for chest pain.   Gastrointestinal: Negative.    Genitourinary: Negative.    Musculoskeletal: Negative.    Skin: Negative.    Neurological: Negative.  Negative for focal weakness, weakness and headaches.   Endo/Heme/Allergies: Negative.    Psychiatric/Behavioral: Negative.  Negative for depression and memory loss. The patient is not nervous/anxious.        Patient Reported Health Risk Assessment       Objective:     /80   Pulse 88   Resp 18   Ht 5' 1" (1.549 m)   Wt 103.9 kg (229 lb)   SpO2 99%   BMI 43.27 kg/m²     Physical Exam  Constitutional:       Appearance: Normal appearance.   HENT:      Head: Normocephalic.      Mouth/Throat:      Pharynx: Oropharynx is clear.   Eyes:      Conjunctiva/sclera: Conjunctivae normal.      Pupils: Pupils are equal, round, and reactive to light.   Cardiovascular:      Rate and Rhythm: Normal rate and regular rhythm.      Heart sounds: Normal heart sounds.   Pulmonary:      Effort: Pulmonary effort is normal.      Breath sounds: Normal breath " sounds.   Abdominal:      General: Abdomen is flat.      Palpations: Abdomen is soft.   Musculoskeletal:         General: Normal range of motion.      Cervical back: Neck supple.   Skin:     General: Skin is warm and dry.   Neurological:      General: No focal deficit present.      Mental Status: She is oriented to person, place, and time.   Psychiatric:         Mood and Affect: Mood normal.         Behavior: Behavior normal.         Thought Content: Thought content normal.         Judgment: Judgment normal.       Lab Results   Component Value Date     02/22/2023    K 4.4 02/22/2023    CO2 32 (H) 02/22/2023    BUN 14.8 02/22/2023    CREATININE 1.11 (H) 02/22/2023    CALCIUM 9.6 02/22/2023    ALBUMIN 3.5 02/22/2023    BILITOT 0.2 02/22/2023    ALKPHOS 97 02/22/2023    AST 17 02/22/2023    ALT 21 02/22/2023    EGFRNONAA 58 10/11/2021   Current GFR 52    Lab Results   Component Value Date    WBC 9.0 02/22/2023    RBC 4.12 (L) 02/22/2023    HGB 12.3 02/22/2023    HCT 39.5 02/22/2023    MCV 95.9 (H) 02/22/2023    RDW 12.7 02/22/2023     02/22/2023      Lab Results   Component Value Date    CHOL 206 (H) 02/22/2023    TRIG 211 (H) 02/22/2023    HDL 38 02/22/2023    .00 02/22/2023    TOTALCHOLEST 5 02/22/2023     Lab Results   Component Value Date    TSH 2.508 02/22/2023     Lab Results   Component Value Date    HGBA1C 7.7 (H) 02/22/2023          No flowsheet data found.  Fall Risk Assessment - Outpatient 4/6/2023   Mobility Status Ambulatory   Number of falls 0   Identified as fall risk 0              Assessment:       ICD-10-CM ICD-9-CM   1. Medicare annual wellness visit, subsequent  Z00.00 V70.0   2. Primary hypertension  I10 401.9   3. Dyslipidemia  E78.5 272.4   4. Type 2 diabetes mellitus with stage 3a chronic kidney disease, without long-term current use of insulin  E11.22 250.40    N18.31 585.3   5. Stage 3a chronic kidney disease (CKD)  N18.31 585.3   6. Pain of left hand  M79.642 729.5   7.  Class 3 severe obesity due to excess calories with serious comorbidity and body mass index (BMI) of 40.0 to 44.9 in adult  E66.01 278.01    Z68.41 V85.41        Plan:     Medicare Annual Wellness and Personalized Prevention Plan:   Fall Risk + Home Safety + Hearing Impairment + Depression Screen + Cognitive Impairment Screen + Health Risk Assessment all reviewed.       Health Maintenance Topics with due status: Not Due       Topic Last Completion Date    Colorectal Cancer Screening 03/23/2021    Foot Exam 07/26/2022    Lipid Panel 02/22/2023    Hemoglobin A1c 02/22/2023    Eye Exam 04/05/2023    High Dose Statin 04/06/2023    Aspirin/Antiplatelet Therapy 04/06/2023      The patient's Health Maintenance was reviewed and the following appears to be due at this time:   Health Maintenance Due   Topic Date Due    DEXA Scan  Never done    Shingles Vaccine (1 of 2) Never done    TETANUS VACCINE  07/12/2015    Diabetes Urine Screening  10/11/2022         1. Medicare annual wellness visit, subsequent  Comments:  Overall health status was reviewed.  Good health habits reinforced.  Annual wellness exams recommended.    2. Primary hypertension  Assessment & Plan:  Prescribed benazepril 10 mg daily, Coreg 25 mg b.i.d., losartan?  100 mg daily.  Blood pressures appear to be adequately controlled with current treatment plan, including prescription blood pressure medication. Continue medical management and continue home blood pressure checks.  Blood pressure should be checked as discussed during medical visits, and average blood pressure should be no greater than 130/80.      3. Dyslipidemia  Assessment & Plan:  Prescribed Crestor 20 mg daily.      4. Type 2 diabetes mellitus with stage 3a chronic kidney disease, without long-term current use of insulin  Assessment & Plan:  Prescribed metformin 850 mg b.i.d..    Component Ref Range & Units 1 mo ago  (2/22/23) 8 mo ago  (7/26/22) 11 mo ago  (4/19/22) 1 yr ago  (10/11/21) 2 yr  "ago  (8/3/20) 3 yr ago  (7/2/19) 3 yr ago  (5/21/19)   Hemoglobin A1c <=7.0 % 7.7 High   7.8 High   8.1 R  10.3 High  R  7.5 High  R  6.8 High  R  7.2 High  R    Estimated Average Glucose mg/dL 174.3  177.2  185.8 R  248.9  168.6  148  160 High  R      Most recent hemoglobin A1c shows good control on diabetic treatment plan, including diabetic prescription medication.  Continue current treatment plan, medical regimen, and lifestyle modification as discussed at this visit and during previous visits.    Orders:  -     Hemoglobin A1C; Future; Expected date: 10/06/2023  -     Microalbumin/Creatinine Ratio, Urine; Future; Expected date: 10/06/2023    5. Stage 3a chronic kidney disease (CKD)  Assessment & Plan:  eGFR mls/min/1.73/m2 52      Current GFR is decreased at 52, we will continue to monitor.    Orders:  -     Comprehensive Metabolic Panel; Future; Expected date: 10/06/2023    6. Pain of left hand    7. Class 3 severe obesity due to excess calories with serious comorbidity and body mass index (BMI) of 40.0 to 44.9 in adult  Assessment & Plan:  Weight loss, healthy dietary choices, increased activity/exercise are recommended.  To lose weight, it is generally recommended to restrict calories to approximately 1500 calories per day to lose 1 lb per week, and approximately 1200 calories per day to lose up to 2 lb per week.  Generally, this is a healthy amount of weight to lose, and an appropriate amount of time to lose this amount of weight.  Adding exercise will assist in losing weight, particularly aerobic exercise such as walking.  However, resistance training, or lifting weights" over time may assistant weight loss by increasing basal metabolic rate, or the rate at which your body burns calories during periods of inactivity.    Keep track of BMI (body mass index), below 25 is considered normal, over 25 but below 30 is considered overweight, anything over 30 is considered moderately obese, over 35 severely obese, " and over 40 is characterized as morbidly obese.           Advance Care Planning   I attest to discussing Advance Care Planning with patient and/or family member.  Education was provided including the importance of the Health Care Power of , Advance Directives, and/or [place] documentation.  The patient expressed understanding to the importance of this information and discussion.           Medication List with Changes/Refills   Current Medications    ASPIRIN (ECOTRIN) 81 MG EC TABLET    1 tablet Orally Once a day for 30 day(s)       Start Date: --        End Date: --    CARVEDILOL (COREG) 25 MG TABLET    Take 1 tablet (25 mg total) by mouth 2 (two) times daily.       Start Date: 9/27/2022 End Date: 9/27/2023    CHOLECALCIFEROL, VITAMIN D3, 125 MCG (5,000 UNIT) TAB    Take 5,000 Units by mouth.       Start Date: --        End Date: --    COENZYME Q10 200 MG CAPSULE    1 capsule with a meal Orally Once a day for 30 day(s)       Start Date: --        End Date: --    GLIPIZIDE (GLUCOTROL) 10 MG TR24    Take 1 tablet (10 mg total) by mouth daily with breakfast.       Start Date: 8/26/2022 End Date: 8/21/2023    LOSARTAN (COZAAR) 100 MG TABLET    Take 1 tablet (100 mg total) by mouth once daily.       Start Date: 9/27/2022 End Date: 9/27/2023    MAGNESIUM OXIDE (MAG-OX) 400 MG (241.3 MG MAGNESIUM) TABLET    Take 1 tablet (400 mg total) by mouth once daily.       Start Date: 9/28/2022 End Date: 9/28/2023    METFORMIN (GLUCOPHAGE) 850 MG TABLET    Take 1 tablet (850 mg total) by mouth 2 (two) times daily.       Start Date: 9/27/2022 End Date: 9/27/2023    OMEPRAZOLE (PRILOSEC) 40 MG CAPSULE    Take 40 mg by mouth.       Start Date: 10/30/2021End Date: --    POTASSIUM CHLORIDE SA (K-DUR,KLOR-CON) 20 MEQ TABLET    Take 1 tablet (20 mEq total) by mouth once daily.       Start Date: 9/27/2022 End Date: 9/27/2023    RIVASTIGMINE TARTRATE (EXELON) 6 MG CAPSULE           Start Date: 5/11/2022 End Date: --    ROSUVASTATIN  (CRESTOR) 20 MG TABLET      See Instructions, TAKE 1 TABLET BY MOUTH AT  BEDTIME, # 90 tab(s), 3 Refill(s), Pharmacy: [facility], 154, cm, Height/Length Dosing, 10/21/21 14:06:00 CDT, 103.3, kg, Weight Dosing, 10/21/21 14:06:00 CDT       Start Date: 10/30/2021End Date: --    TAMSULOSIN (FLOMAX) 0.4 MG CAP    Take 0.4 mg by mouth.       Start Date: 10/30/2021End Date: --    TORSEMIDE (DEMADEX) 20 MG TAB    Take 1 tablet (20 mg total) by mouth 2 (two) times a day.       Start Date: 10/14/2022End Date: 10/14/2023    TRIAMCINOLONE ACETONIDE 0.5% (KENALOG) 0.5 % CREA    APPLY 1 APPLICATION ON THE SKIN TWICE A DAY       Start Date: 6/7/2022  End Date: --   Discontinued Medications    BENAZEPRIL (LOTENSIN) 10 MG TABLET    as directed       Start Date: --        End Date: 4/6/2023    HYDROXYZINE HCL (ATARAX) 25 MG TABLET    TAKE 0.5-1 TABLETS BY MOUTH THREE TIMES A DAY AS NEEDED FOR ITCHING       Start Date: 6/7/2022  End Date: 4/6/2023    MELOXICAM (MOBIC) 15 MG TABLET    1 tablet       Start Date: --        End Date: 4/6/2023    OMEGA 3-DHA-EPA-FISH OIL 1,000 MG (120 MG-180 MG) CAP    1 capsule.       Start Date: --        End Date: 4/6/2023    OMEPRAZOLE (PRILOSEC) 40 MG CAPSULE    Take 1 capsule (40 mg total) by mouth Daily.       Start Date: 9/27/2022 End Date: 4/6/2023    POTASSIUM CHLORIDE (K-TAB) 20 MEQ    Take 20 mEq by mouth.       Start Date: 10/30/2021End Date: 4/6/2023    ROSUVASTATIN (CRESTOR) 20 MG TABLET    Take 1 tablet (20 mg total) by mouth once daily.       Start Date: 9/27/2022 End Date: 4/6/2023    TAMSULOSIN (FLOMAX) 0.4 MG CAP    Take 1 capsule (0.4 mg total) by mouth once daily.       Start Date: 9/27/2022 End Date: 4/6/2023        Follow up in about 6 months (around 10/16/2023) for Chronic condition F/up. In addition to their scheduled follow up, the patient has also been instructed to follow up on as needed basis.

## 2023-04-06 ENCOUNTER — OFFICE VISIT (OUTPATIENT)
Dept: PRIMARY CARE CLINIC | Facility: CLINIC | Age: 78
End: 2023-04-06
Payer: MEDICARE

## 2023-04-06 VITALS
BODY MASS INDEX: 43.23 KG/M2 | OXYGEN SATURATION: 99 % | DIASTOLIC BLOOD PRESSURE: 80 MMHG | SYSTOLIC BLOOD PRESSURE: 135 MMHG | HEART RATE: 88 BPM | RESPIRATION RATE: 18 BRPM | HEIGHT: 61 IN | WEIGHT: 229 LBS

## 2023-04-06 DIAGNOSIS — N18.31 TYPE 2 DIABETES MELLITUS WITH STAGE 3A CHRONIC KIDNEY DISEASE, WITHOUT LONG-TERM CURRENT USE OF INSULIN: Chronic | ICD-10-CM

## 2023-04-06 DIAGNOSIS — N18.31 STAGE 3A CHRONIC KIDNEY DISEASE (CKD): Chronic | ICD-10-CM

## 2023-04-06 DIAGNOSIS — Z00.00 MEDICARE ANNUAL WELLNESS VISIT, SUBSEQUENT: Primary | ICD-10-CM

## 2023-04-06 DIAGNOSIS — E78.5 DYSLIPIDEMIA: Chronic | ICD-10-CM

## 2023-04-06 DIAGNOSIS — I10 PRIMARY HYPERTENSION: Chronic | ICD-10-CM

## 2023-04-06 DIAGNOSIS — E66.01 CLASS 3 SEVERE OBESITY DUE TO EXCESS CALORIES WITH SERIOUS COMORBIDITY AND BODY MASS INDEX (BMI) OF 40.0 TO 44.9 IN ADULT: Chronic | ICD-10-CM

## 2023-04-06 DIAGNOSIS — M79.642 PAIN OF LEFT HAND: ICD-10-CM

## 2023-04-06 DIAGNOSIS — E11.22 TYPE 2 DIABETES MELLITUS WITH STAGE 3A CHRONIC KIDNEY DISEASE, WITHOUT LONG-TERM CURRENT USE OF INSULIN: Chronic | ICD-10-CM

## 2023-04-06 PROBLEM — E66.813 CLASS 3 SEVERE OBESITY DUE TO EXCESS CALORIES WITH SERIOUS COMORBIDITY AND BODY MASS INDEX (BMI) OF 40.0 TO 44.9 IN ADULT: Chronic | Status: ACTIVE | Noted: 2022-07-24

## 2023-04-06 PROCEDURE — 3075F SYST BP GE 130 - 139MM HG: CPT | Mod: CPTII,,, | Performed by: GENERAL PRACTICE

## 2023-04-06 PROCEDURE — G0439 PPPS, SUBSEQ VISIT: HCPCS | Mod: ,,, | Performed by: GENERAL PRACTICE

## 2023-04-06 PROCEDURE — 3079F DIAST BP 80-89 MM HG: CPT | Mod: CPTII,,, | Performed by: GENERAL PRACTICE

## 2023-04-06 PROCEDURE — 1159F MED LIST DOCD IN RCRD: CPT | Mod: CPTII,,, | Performed by: GENERAL PRACTICE

## 2023-04-06 PROCEDURE — 3288F FALL RISK ASSESSMENT DOCD: CPT | Mod: CPTII,,, | Performed by: GENERAL PRACTICE

## 2023-04-06 PROCEDURE — 3288F PR FALLS RISK ASSESSMENT DOCUMENTED: ICD-10-PCS | Mod: CPTII,,, | Performed by: GENERAL PRACTICE

## 2023-04-06 PROCEDURE — 3079F PR MOST RECENT DIASTOLIC BLOOD PRESSURE 80-89 MM HG: ICD-10-PCS | Mod: CPTII,,, | Performed by: GENERAL PRACTICE

## 2023-04-06 PROCEDURE — G0439 PR MEDICARE ANNUAL WELLNESS SUBSEQUENT VISIT: ICD-10-PCS | Mod: ,,, | Performed by: GENERAL PRACTICE

## 2023-04-06 PROCEDURE — 1159F PR MEDICATION LIST DOCUMENTED IN MEDICAL RECORD: ICD-10-PCS | Mod: CPTII,,, | Performed by: GENERAL PRACTICE

## 2023-04-06 PROCEDURE — 3075F PR MOST RECENT SYSTOLIC BLOOD PRESS GE 130-139MM HG: ICD-10-PCS | Mod: CPTII,,, | Performed by: GENERAL PRACTICE

## 2023-04-06 PROCEDURE — 1101F PR PT FALLS ASSESS DOC 0-1 FALLS W/OUT INJ PAST YR: ICD-10-PCS | Mod: CPTII,,, | Performed by: GENERAL PRACTICE

## 2023-04-06 PROCEDURE — 1101F PT FALLS ASSESS-DOCD LE1/YR: CPT | Mod: CPTII,,, | Performed by: GENERAL PRACTICE

## 2023-04-06 RX ORDER — GLUCOSAM/CHONDRO/HERB 149/HYAL 750-100 MG
1 TABLET ORAL
COMMUNITY
End: 2023-04-06

## 2023-04-06 RX ORDER — ASPIRIN 81 MG/1
TABLET ORAL
COMMUNITY

## 2023-04-06 RX ORDER — ACETAMINOPHEN 160 MG/5ML
SUSPENSION, ORAL (FINAL DOSE FORM) ORAL
COMMUNITY

## 2023-10-09 ENCOUNTER — TELEPHONE (OUTPATIENT)
Dept: PRIMARY CARE CLINIC | Facility: CLINIC | Age: 78
End: 2023-10-09
Payer: MEDICARE

## 2023-10-09 NOTE — TELEPHONE ENCOUNTER
----- Message from Kristina Doyle LPN sent at 10/9/2023 10:29 AM CDT -----  Regarding: FW: med advice  She will need labs before her visit. Contact to schedule an nurse visit, please  ----- Message -----  From: Mane April  Sent: 10/9/2023  10:25 AM CDT  To: Eliezer Victor Staff  Subject: med advice                                       Type:  Needs Medical Advice    Who Called:  patient  Symptoms (please be specific):    How long has patient had these symptoms:    Pharmacy name and phone #:    Would the patient rather a call back or a response via [facility]? Call back  Best Call Back Number: [phone]  Additional Information: patient would like to know if any labs are needed for her upcoming appointment. Please advise.

## 2023-10-11 ENCOUNTER — CLINICAL SUPPORT (OUTPATIENT)
Dept: PRIMARY CARE CLINIC | Facility: CLINIC | Age: 78
End: 2023-10-11
Payer: MEDICARE

## 2023-10-11 DIAGNOSIS — E11.22 TYPE 2 DIABETES MELLITUS WITH STAGE 3A CHRONIC KIDNEY DISEASE, WITHOUT LONG-TERM CURRENT USE OF INSULIN: Chronic | ICD-10-CM

## 2023-10-11 DIAGNOSIS — N18.31 TYPE 2 DIABETES MELLITUS WITH STAGE 3A CHRONIC KIDNEY DISEASE, WITHOUT LONG-TERM CURRENT USE OF INSULIN: Chronic | ICD-10-CM

## 2023-10-11 DIAGNOSIS — E11.22 TYPE 2 DIABETES MELLITUS WITH STAGE 3A CHRONIC KIDNEY DISEASE, WITHOUT LONG-TERM CURRENT USE OF INSULIN: Primary | Chronic | ICD-10-CM

## 2023-10-11 DIAGNOSIS — N18.31 TYPE 2 DIABETES MELLITUS WITH STAGE 3A CHRONIC KIDNEY DISEASE, WITHOUT LONG-TERM CURRENT USE OF INSULIN: Primary | Chronic | ICD-10-CM

## 2023-10-11 LAB
EST. AVERAGE GLUCOSE BLD GHB EST-MCNC: 168.6 MG/DL
HBA1C MFR BLD: 7.5 %

## 2023-10-11 PROCEDURE — 36415 COLL VENOUS BLD VENIPUNCTURE: CPT | Mod: ,,, | Performed by: GENERAL PRACTICE

## 2023-10-11 PROCEDURE — 36415 COLL VENOUS BLD VENIPUNCTURE: CPT

## 2023-10-11 PROCEDURE — 36415 PR COLLECTION VENOUS BLOOD,VENIPUNCTURE: ICD-10-PCS | Mod: ,,, | Performed by: GENERAL PRACTICE

## 2023-10-12 ENCOUNTER — TELEPHONE (OUTPATIENT)
Dept: PRIMARY CARE CLINIC | Facility: CLINIC | Age: 78
End: 2023-10-12
Payer: MEDICARE

## 2023-10-12 NOTE — TELEPHONE ENCOUNTER
Pt had to cancel appt due to going out of town. Pt will call back to reschedule appt once she is back

## 2023-10-12 NOTE — TELEPHONE ENCOUNTER
----- Message from Valente Martins MD sent at 10/12/2023  7:32 AM CDT -----  Please look into this, the patient had a follow-up appointment was canceled, and not rescheduled.  She has lab work that was done.  ----- Message -----  From: Lab, Background User  Sent: 10/11/2023  12:18 PM CDT  To: Valente Martins MD

## 2023-10-19 ENCOUNTER — TELEPHONE (OUTPATIENT)
Dept: PRIMARY CARE CLINIC | Facility: CLINIC | Age: 78
End: 2023-10-19
Payer: MEDICARE

## 2023-10-19 NOTE — TELEPHONE ENCOUNTER
----- Message from Yuliet Sotomayor sent at 10/19/2023 12:43 PM CDT -----  Regarding: advice  Type:  Needs Medical Advice    Who Called: pt  Symptoms (please be specific):    How long has patient had these symptoms:    Pharmacy name and phone #:    Would the patient rather a call back or a response via [facility]?   Best Call Back Number: [phone]  Additional Information: pt called about needing to speak to nurse about medication she is taking. Please advise

## 2023-10-23 ENCOUNTER — TELEPHONE (OUTPATIENT)
Dept: PRIMARY CARE CLINIC | Facility: CLINIC | Age: 78
End: 2023-10-23
Payer: MEDICARE

## 2023-10-23 DIAGNOSIS — E11.65 TYPE 2 DIABETES MELLITUS WITH HYPERGLYCEMIA, WITHOUT LONG-TERM CURRENT USE OF INSULIN: Primary | ICD-10-CM

## 2023-10-23 DIAGNOSIS — E78.5 DYSLIPIDEMIA: Chronic | ICD-10-CM

## 2023-10-23 RX ORDER — ROSUVASTATIN CALCIUM 20 MG/1
TABLET, COATED ORAL
Qty: 90 TABLET | Refills: 3 | Status: SHIPPED | OUTPATIENT
Start: 2023-10-23

## 2023-10-23 RX ORDER — GLIPIZIDE 10 MG/1
10 TABLET, FILM COATED, EXTENDED RELEASE ORAL
Qty: 90 TABLET | Refills: 3 | Status: SHIPPED | OUTPATIENT
Start: 2023-10-23 | End: 2024-10-17

## 2023-10-23 NOTE — TELEPHONE ENCOUNTER
----- Message from Jena Inman sent at 10/23/2023  3:06 PM CDT -----  Regarding: med refill  .Type:  RX Refill Request    Who Called: pt  Refill or New Rx:refill  RX Name and Strength:glipiZIDE (GLUCOTROL) 10 MG TR24  How is the patient currently taking it? (ex. 1XDay):1xday  Is this a 30 day or 90 day RX:90  Preferred Pharmacy with phone number:[facility] - [place] [id number] [facility]   Phone: [phone]  Fax: [phone]  Local or Mail Order:mail  Ordering Provider:Eliezer  Would the patient rather a call back or a response via "[employer]? Call back  Best Call Back Number:[phone]  Additional Information: pt needs authorization from dr to refill prescription     Type:  RX Refill Request    Who Called: pt  Refill or New Rx:refill  RX Name and Strength:rosuvastatin (CRESTOR) 20 MG tablet   How is the patient currently taking it? (ex. 1XDay):1xday  Is this a 30 day or 90 day RX:90  Preferred Pharm[facility] with phone number[facility] [address] [facility]   Phone: [phone]  Fax: [phone]  Local or Mail Order:mail  Ordering Provider:Eliezer  Would the patient rather a call back or a response via [facility]? Call back  Best Call Back Number:[phone]  Additional Information: pt needs authorization from dr to refill prescription

## 2023-10-24 ENCOUNTER — TELEPHONE (OUTPATIENT)
Dept: PRIMARY CARE CLINIC | Facility: CLINIC | Age: 78
End: 2023-10-24
Payer: MEDICARE

## 2023-10-24 DIAGNOSIS — I10 PRIMARY HYPERTENSION: Chronic | ICD-10-CM

## 2023-10-24 RX ORDER — LOSARTAN POTASSIUM 100 MG/1
100 TABLET ORAL DAILY
Qty: 90 TABLET | Refills: 3 | Status: SHIPPED | OUTPATIENT
Start: 2023-10-24 | End: 2024-10-23

## 2023-10-24 NOTE — TELEPHONE ENCOUNTER
----- Message from Jena Inman sent at 10/24/2023  2:29 PM CDT -----  Regarding: med refill  .Type:  RX Refill Request    Who Called: pt  Refill or New Rx:refill  RX Name and Strength:losartan (COZAAR) 100 MG tablet  How is the patient currently taking it? (ex. 1XDay):1xday  Is this a 30 day or 90 day RX:90  Preferred Pharmacy with phone number:[facility] - [facility] [address]   Phone: [phone]  Fax: [phone]  Local or Mail Order:mail  Ordering Provider:Eliezer  Would the patient rather a call back or a response via [facility]? Call back   Best Call Back Number:[phone]  Additional Information: pt needs authorization from  to refill prescription

## 2023-11-22 DIAGNOSIS — E78.5 DYSLIPIDEMIA: Primary | Chronic | ICD-10-CM

## 2023-11-22 RX ORDER — RIVASTIGMINE TARTRATE 6 MG/1
6 CAPSULE ORAL 2 TIMES DAILY
Qty: 180 CAPSULE | Refills: 3 | Status: SHIPPED | OUTPATIENT
Start: 2023-11-22

## 2023-12-15 ENCOUNTER — HOSPITAL ENCOUNTER (EMERGENCY)
Facility: HOSPITAL | Age: 78
Discharge: HOME | End: 2023-12-15
Attending: EMERGENCY MEDICINE
Payer: MEDICARE

## 2023-12-15 VITALS
TEMPERATURE: 97 F | DIASTOLIC BLOOD PRESSURE: 81 MMHG | WEIGHT: 230 LBS | SYSTOLIC BLOOD PRESSURE: 178 MMHG | OXYGEN SATURATION: 96 % | RESPIRATION RATE: 20 BRPM | HEART RATE: 73 BPM | BODY MASS INDEX: 43.46 KG/M2

## 2023-12-15 DIAGNOSIS — R07.9 CHEST PAIN: Primary | ICD-10-CM

## 2023-12-15 DIAGNOSIS — M67.912 TENDINOPATHY OF LEFT ROTATOR CUFF: ICD-10-CM

## 2023-12-15 DIAGNOSIS — I16.0 HYPERTENSIVE URGENCY: ICD-10-CM

## 2023-12-15 LAB
ALBUMIN SERPL-MCNC: 4 G/DL (ref 3.4–4.8)
ALBUMIN/GLOB SERPL: 1.2 RATIO (ref 1.1–2)
ALP SERPL-CCNC: 97 UNIT/L (ref 40–150)
ALT SERPL-CCNC: 23 UNIT/L (ref 0–55)
AST SERPL-CCNC: 19 UNIT/L (ref 5–34)
BASOPHILS # BLD AUTO: 0.07 X10(3)/MCL
BASOPHILS NFR BLD AUTO: 0.9 %
BILIRUB SERPL-MCNC: 0.4 MG/DL
BNP BLD-MCNC: 35.9 PG/ML
BUN SERPL-MCNC: 15.3 MG/DL (ref 9.8–20.1)
CALCIUM SERPL-MCNC: 10.4 MG/DL (ref 8.4–10.2)
CHLORIDE SERPL-SCNC: 100 MMOL/L (ref 98–107)
CO2 SERPL-SCNC: 30 MMOL/L (ref 23–31)
CREAT SERPL-MCNC: 0.94 MG/DL (ref 0.55–1.02)
EOSINOPHIL # BLD AUTO: 0.15 X10(3)/MCL (ref 0–0.9)
EOSINOPHIL NFR BLD AUTO: 1.8 %
ERYTHROCYTE [DISTWIDTH] IN BLOOD BY AUTOMATED COUNT: 12 % (ref 11.5–17)
GFR SERPLBLD CREATININE-BSD FMLA CKD-EPI: >60 MLS/MIN/1.73/M2
GLOBULIN SER-MCNC: 3.4 GM/DL (ref 2.4–3.5)
GLUCOSE SERPL-MCNC: 168 MG/DL (ref 82–115)
HCT VFR BLD AUTO: 45.9 % (ref 37–47)
HGB BLD-MCNC: 14.6 G/DL (ref 12–16)
IMM GRANULOCYTES # BLD AUTO: 0.02 X10(3)/MCL (ref 0–0.04)
IMM GRANULOCYTES NFR BLD AUTO: 0.2 %
LYMPHOCYTES # BLD AUTO: 2.5 X10(3)/MCL (ref 0.6–4.6)
LYMPHOCYTES NFR BLD AUTO: 30.4 %
MCH RBC QN AUTO: 29.6 PG (ref 27–31)
MCHC RBC AUTO-ENTMCNC: 31.8 G/DL (ref 33–36)
MCV RBC AUTO: 92.9 FL (ref 80–94)
MONOCYTES # BLD AUTO: 0.48 X10(3)/MCL (ref 0.1–1.3)
MONOCYTES NFR BLD AUTO: 5.8 %
NEUTROPHILS # BLD AUTO: 5 X10(3)/MCL (ref 2.1–9.2)
NEUTROPHILS NFR BLD AUTO: 60.9 %
NRBC BLD AUTO-RTO: 0 %
PLATELET # BLD AUTO: 310 X10(3)/MCL (ref 130–400)
PMV BLD AUTO: 10.2 FL (ref 7.4–10.4)
POTASSIUM SERPL-SCNC: 4.6 MMOL/L (ref 3.5–5.1)
PROT SERPL-MCNC: 7.4 GM/DL (ref 5.8–7.6)
RBC # BLD AUTO: 4.94 X10(6)/MCL (ref 4.2–5.4)
SODIUM SERPL-SCNC: 137 MMOL/L (ref 136–145)
TROPONIN I SERPL-MCNC: <0.01 NG/ML (ref 0–0.04)
TROPONIN I SERPL-MCNC: <0.01 NG/ML (ref 0–0.04)
WBC # SPEC AUTO: 8.22 X10(3)/MCL (ref 4.5–11.5)

## 2023-12-15 PROCEDURE — 25000003 PHARM REV CODE 250: Performed by: STUDENT IN AN ORGANIZED HEALTH CARE EDUCATION/TRAINING PROGRAM

## 2023-12-15 PROCEDURE — 83880 ASSAY OF NATRIURETIC PEPTIDE: CPT | Performed by: NURSE PRACTITIONER

## 2023-12-15 PROCEDURE — 93005 ELECTROCARDIOGRAM TRACING: CPT

## 2023-12-15 PROCEDURE — 63600175 PHARM REV CODE 636 W HCPCS: Performed by: STUDENT IN AN ORGANIZED HEALTH CARE EDUCATION/TRAINING PROGRAM

## 2023-12-15 PROCEDURE — 99285 EMERGENCY DEPT VISIT HI MDM: CPT | Mod: 25

## 2023-12-15 PROCEDURE — 80053 COMPREHEN METABOLIC PANEL: CPT | Performed by: NURSE PRACTITIONER

## 2023-12-15 PROCEDURE — 85025 COMPLETE CBC W/AUTO DIFF WBC: CPT | Performed by: NURSE PRACTITIONER

## 2023-12-15 PROCEDURE — 84484 ASSAY OF TROPONIN QUANT: CPT | Performed by: NURSE PRACTITIONER

## 2023-12-15 PROCEDURE — 96374 THER/PROPH/DIAG INJ IV PUSH: CPT

## 2023-12-15 PROCEDURE — 96375 TX/PRO/DX INJ NEW DRUG ADDON: CPT

## 2023-12-15 PROCEDURE — 84484 ASSAY OF TROPONIN QUANT: CPT | Performed by: STUDENT IN AN ORGANIZED HEALTH CARE EDUCATION/TRAINING PROGRAM

## 2023-12-15 RX ORDER — LOSARTAN POTASSIUM 50 MG/1
100 TABLET ORAL ONCE
Status: COMPLETED | OUTPATIENT
Start: 2023-12-15 | End: 2023-12-15

## 2023-12-15 RX ORDER — CLONIDINE HYDROCHLORIDE 0.2 MG/1
0.2 TABLET ORAL
Status: COMPLETED | OUTPATIENT
Start: 2023-12-15 | End: 2023-12-15

## 2023-12-15 RX ORDER — KETOROLAC TROMETHAMINE 30 MG/ML
30 INJECTION, SOLUTION INTRAMUSCULAR; INTRAVENOUS ONCE
Status: COMPLETED | OUTPATIENT
Start: 2023-12-15 | End: 2023-12-15

## 2023-12-15 RX ORDER — AMLODIPINE BESYLATE 5 MG/1
5 TABLET ORAL DAILY
Qty: 30 TABLET | Refills: 0 | Status: SHIPPED | OUTPATIENT
Start: 2023-12-15 | End: 2024-01-14

## 2023-12-15 RX ORDER — FUROSEMIDE 10 MG/ML
40 INJECTION INTRAMUSCULAR; INTRAVENOUS
Status: COMPLETED | OUTPATIENT
Start: 2023-12-15 | End: 2023-12-15

## 2023-12-15 RX ORDER — NIFEDIPINE 30 MG/1
30 TABLET, EXTENDED RELEASE ORAL ONCE
Status: COMPLETED | OUTPATIENT
Start: 2023-12-15 | End: 2023-12-15

## 2023-12-15 RX ADMIN — LOSARTAN POTASSIUM 100 MG: 50 TABLET, FILM COATED ORAL at 12:12

## 2023-12-15 RX ADMIN — CLONIDINE HYDROCHLORIDE 0.2 MG: 0.2 TABLET ORAL at 02:12

## 2023-12-15 RX ADMIN — FUROSEMIDE 40 MG: 10 INJECTION, SOLUTION INTRAMUSCULAR; INTRAVENOUS at 12:12

## 2023-12-15 RX ADMIN — NIFEDIPINE 30 MG: 30 TABLET, FILM COATED, EXTENDED RELEASE ORAL at 01:12

## 2023-12-15 RX ADMIN — KETOROLAC TROMETHAMINE 30 MG: 30 INJECTION, SOLUTION INTRAMUSCULAR at 01:12

## 2023-12-15 NOTE — ED PROVIDER NOTES
Encounter Date: 12/15/2023       History     Chief Complaint   Patient presents with    Chest Pain     Left sided chest pain x 2 weeks, reports hx of heart blockage, no cardiac stents. Sees Dr. Larson, Intermittent SOB. Reports numbness to left arm 0300am earlier, resolved now     78-year-old female with past medical history of hypertension and coronary artery disease (diagnosed in 2005) well known to Dr. Larson, presents with complaints of left-sided chest and back pain for the past 2 weeks.  Patient recently turned from a 2 month stay in [place] while caring for her 20-esleq-lph grandson.  She reports he weighs 20 lb and may have done a lot of lifting during her stay.  Upon return to [place] she went to outside ER for this chest pain on 12.11.23.  Workup was unrevealing and she was given Toradol IV and Tylenol, which provided relief, and discharged home with tramadol.  Reports episode of left arm numbness and tingling around 0300 this am, which has resolved. Denies any trauma or falls involving left side. Does not sleep on left arm.     Has not seen cardiologist in over a year due to frequency and length of trips to [place].    Pain is described as originating below left scapula and coming forward to chest. Does not radiate up jaw.    The history is provided by the patient and medical records.     Review of patient's allergies indicates:   Allergen Reactions    Pregabalin      Other reaction(s): fatigue     Past Medical History:   Diagnosis Date    Arthritis      Past Surgical History:   Procedure Laterality Date    COLONOSCOPY  02/23/2021     No family history on file.  Social History     Tobacco Use    Smoking status: Never    Smokeless tobacco: Never     Review of Systems   Constitutional:  Negative for chills and fever.   Gastrointestinal:  Negative for diarrhea, nausea and vomiting.   Musculoskeletal:  Positive for arthralgias.   Neurological:  Negative for headaches.   Psychiatric/Behavioral:  Negative for  confusion.        Physical Exam     Initial Vitals [12/15/23 0916]   BP Pulse Resp Temp SpO2   (!) 191/99 78 19 97.4 °F (36.3 °C) 97 %      MAP       --         Physical Exam    Constitutional: She is Obese . She is cooperative. She does not have a sickly appearance. She does not appear ill. No distress.   Cardiovascular:  Normal rate and regular rhythm.           Pulmonary/Chest: No respiratory distress. She has no wheezes. She has no rhonchi. She has rales in the right lower field and the left lower field.   Musculoskeletal:      Left shoulder: No swelling, deformity or tenderness. Normal range of motion.      Comments: Left upper arm with normal range of motion.  Slight twinge of pain with rotation.    Trace lower limb pitting edema bilaterally.     Neurological: She is alert.         ED Course   Procedures  Labs Reviewed   CBC WITH DIFFERENTIAL - Abnormal; Notable for the following components:       Result Value    MCHC 31.8 (*)     All other components within normal limits   COMPREHENSIVE METABOLIC PANEL - Abnormal; Notable for the following components:    Glucose Level 168 (*)     Calcium Level Total 10.4 (*)     All other components within normal limits   B-TYPE NATRIURETIC PEPTIDE - Normal   TROPONIN I - Normal   TROPONIN I - Normal        ECG Results              EKG 12-lead (Final result)  Result time 12/15/23 17:01:01      Final result by Interface, Lab In [facility] (12/15/23 17:01:01)                   Narrative:    Test Reason : R07.9,    Vent. Rate : 067 BPM     Atrial Rate : 067 BPM     P-R Int : 156 ms          QRS Dur : 080 ms      QT Int : 396 ms       P-R-T Axes : 071 010 015 degrees     QTc Int : 418 ms    Normal sinus rhythm  Minimal voltage criteria for LVH, may be normal variant ( R in aVL )  Borderline Abnormal ECG  When compared with ECG of 15-DEC-2023 09:16,  No significant change was found  Confirmed by Brooks Becerra MD ([id number]) on 12/15/2023 5:00:52 PM    Referred By: FLORENTIN ACOSTA            Confirmed By:Brooks Becerra MD                                     EKG 12-lead (Final result)  Result time 12/15/23 12:17:22      Final result by Ivelisse, Lab In [facility] (12/15/23 12:17:22)                   Narrative:    Test Reason : R07.9,    Vent. Rate : 076 BPM     Atrial Rate : 076 BPM     P-R Int : 104 ms          QRS Dur : 072 ms      QT Int : 382 ms       P-R-T Axes : 027 020 033 degrees     QTc Int : 429 ms    Sinus rhythm with short VA  Otherwise normal ECG  No previous ECGs available  Confirmed by Brooks Becerra MD ([id number]) on 12/15/2023 12:17:11 PM    Referred By:             Confirmed By:Brooks Becerra MD                                  Imaging Results              X-Ray Chest 1 View (Final result)  Result time 12/15/23 10:38:20      Final result by Anoop Oshea MD (12/15/23 10:38:20)                   Impression:      No acute pulmonary process identified.      Electronically signed by: Anoop Oshea  Date:    12/15/2023  Time:    10:38               Narrative:    EXAMINATION:  XR CHEST 1 VIEW    CLINICAL HISTORY:  chest pain;    TECHNIQUE:  Frontal view(s) of the chest.    COMPARISON:  Radiography 05/21/2019    FINDINGS:  Normal cardiac silhouette.  The lungs are well-inflated.  No consolidation identified.  No significant pleural effusion or discernible pneumothorax.                                    X-Rays:   Independently Interpreted Readings:   Other Readings:  Airway appears midline without shifting.  No appreciable bony abnormality or fracture.  Cardiac silhouette within normal limits.  There appears to be some pulmonary vascular congestion, more prominent on right side.  Diaphragm visible without blunting or hazy opacities.    Medications   losartan tablet 100 mg (100 mg Oral Given 12/15/23 1221)   furosemide injection 40 mg (40 mg Intravenous Given 12/15/23 1238)   NIFEdipine 24 hr tablet 30 mg (30 mg Oral Given 12/15/23 1324)   ketorolac injection 30 mg (30 mg Intravenous Given  12/15/23 1345)   cloNIDine tablet 0.2 mg (0.2 mg Oral Given 12/15/23 1431)     Medical Decision Making  Differential diagnoses include musculoskeletal strain, MI, ACS, rotator cuff tendinopathy with radiculopathy, PE, pericarditis.    1200:  Discussed case with [facility] who advised repeating troponin, higher dose IV Lasix, home losartan and nifedipine 30 mg if blood pressure does not respond.    1245:  She reports the chest pain has resolved.  Remains hypertensive 200/105.  Given IV Lasix, p.o. losartan.  Patient currently resting in darkened room.  She has no recent echo on file.      1345:  Blood pressure labile.  Rises when patient reports she is feeling pain.  Repeat troponin negative.  Repeat EKG performed.  Received IV Toradol 30 mg x 1.    1430:  Repeat EKG normal sinus rhythm, no ST elevations.  Blood pressure currently 196/104.  We will give clonidine at this time.    1500:  Blood pressure 192/74.  Patient with no pain presently.  Medically cleared for discharge at this time.  Advised we will be adding amlodipine to her regimen as she is already on max dose Coreg and Losartan 100mg. Patient agreeable to new medication. Also advised strict adherence to regimen, check home BP with brachial cuff and keep follow-up with Dr. Larson ([facility]).    Amount and/or Complexity of Data Reviewed  Labs: ordered.     Details: CBC, CMP, BNP, troponin were all unremarkable.  Radiology: ordered.     Details: Chest x-ray    Risk  Prescription drug management.                                  Clinical Impression:  Final diagnoses:  [R07.9] Chest pain (Primary)  [M67.912] Tendinopathy of left rotator cuff  [I16.0] Hypertensive urgency       ED Prescriptions       Medication Sig Dispense Start Date End Date Auth. Provider    amLODIPine (NORVASC) 5 MG tablet Take 1 tablet (5 mg total) by mouth once daily. 30 tablet 12/15/2023 1/14/2024 Yariel Coats MD          Follow-up Information       Follow up With Specialties Details Why Contact  Info    Valente Martins MD Family Medicine Schedule an appointment as soon as possible for a visit in 1 week  [address] [place] [address].  [address]  [facility] [address]  [phone]      [facility] - Emergency Dept Emergency Medicine  If symptoms worsen [address] [facility] [id number] [phone]    Anatoly Larson MD Cardiology Go on 12/19/2023  [address] [place] [address].  [facility] [address]  [phone]               Yariel Coats MD  Resident  12/15/23 1504       Yariel Coats MD  Resident  12/16/23 0602

## 2023-12-15 NOTE — FIRST PROVIDER EVALUATION
Medical screening examination initiated.  I have conducted a focused provider triage encounter, findings are as follows:  Chief Complaint   Patient presents with    Chest Pain     Left sided chest pain x 2 weeks, reports hx of heart blockage, no cardiac stents. Sees Dr. Larson, Intermittent SOB. Reports numbness to left arm 0300am earlier, resolved now         Brief history of present illness:  78 y/p presents with cp x 2 weeks.    Vitals:    12/15/23 0916   BP: (!) 191/99   BP Location: Left arm   Patient Position: Sitting   Pulse: 78   Resp: 19   Temp: 97.4 °F (36.3 °C)   TempSrc: Oral   SpO2: 97%   Weight: 104.3 kg (230 lb)       Pertinent physical exam:  alert, nonlabored    Brief workup plan:  EKG, labs, image    Preliminary workup initiated; this workup will be continued and followed by the physician or advanced practice provider that is assigned to the patient when roomed.

## 2023-12-18 ENCOUNTER — PATIENT OUTREACH (OUTPATIENT)
Dept: EMERGENCY MEDICINE | Facility: HOSPITAL | Age: 78
End: 2023-12-18
Payer: MEDICARE

## 2023-12-18 ENCOUNTER — TELEPHONE (OUTPATIENT)
Dept: PRIMARY CARE CLINIC | Facility: CLINIC | Age: 78
End: 2023-12-18
Payer: MEDICARE

## 2023-12-18 NOTE — PROGRESS NOTES
Patient was seen in the ED on 12/15/23 for chest pain. They have an appointment scheduled with Dr. Valente Martins on 1/3/23 at 4:00. ED navigator will remind patient of appointment.

## 2023-12-18 NOTE — TELEPHONE ENCOUNTER
Called pt and scheduled for next 30 minute spot available 01/03/24. Pt confirmed appt time and date. Added pt to wait list.

## 2023-12-18 NOTE — TELEPHONE ENCOUNTER
----- Message from Kristina Doyle LPN sent at 12/18/2023 12:47 PM CST -----  Regarding: FW: ED F/U    ----- Message -----  From: Laurie Washington  Sent: 12/18/2023  12:15 PM CST  To: Eliezer Victor Staff  Subject: ED F/U                                           Hi! Emily Oshea was seen in the ED on 12/15/23 for chest pain. They are part of the [facility] Medicare quality work for the system with 2 or more chronic conditions that requires a post ED 7 day appointment. Could you please contact this patient to schedule either an in-office or virtual F/U appt by 12/26/23 if possible? Thank you!

## 2023-12-18 NOTE — TELEPHONE ENCOUNTER
----- Message from Kristina Doyle LPN sent at 12/18/2023 12:47 PM CST -----  Regarding: FW: ED F/U    ----- Message -----  From: Laurie Washington  Sent: 12/18/2023  12:15 PM CST  To: Eliezer Victor Staff  Subject: ED F/U                                           Hi! Emily Oshea was seen in the ED on 12/15/23 for chest pain. They are part of the [facility] Medicare quality work for the system with 2 or more chronic conditions that requires a post ED 7 day appointment. Could you please contact this patient to schedule either an in-office or virtual F/U appt by 12/26/23 if possible? Thank you!

## 2024-01-01 NOTE — TELEPHONE ENCOUNTER
----- Message from Nidia Paniagua LPN sent at 2/7/2023  9:19 AM CST -----  Regarding: FW: nurse visit  Can  you please schedule pt for nurse visit please   ----- Message -----  From: Yuliet Sotomayor  Sent: 2/7/2023   9:01 AM CST  To: Eliezer Victor Staff  Subject: nurse visit                                      Pt called wanting to do her lab work in office with the nurse for the week of the 20th-24th  [phone]       <<-----Click here for Discharge Medication Review

## 2024-01-02 ENCOUNTER — PATIENT OUTREACH (OUTPATIENT)
Dept: EMERGENCY MEDICINE | Facility: HOSPITAL | Age: 79
End: 2024-01-02
Payer: MEDICARE

## 2024-04-04 ENCOUNTER — PATIENT OUTREACH (OUTPATIENT)
Dept: ADMINISTRATIVE | Facility: HOSPITAL | Age: 79
End: 2024-04-04
Payer: MEDICARE

## 2024-04-04 DIAGNOSIS — Z13.220 SCREENING CHOLESTEROL LEVEL: ICD-10-CM

## 2024-04-04 DIAGNOSIS — E11.22 TYPE 2 DIABETES MELLITUS WITH STAGE 3A CHRONIC KIDNEY DISEASE, WITHOUT LONG-TERM CURRENT USE OF INSULIN: Chronic | ICD-10-CM

## 2024-04-04 DIAGNOSIS — N18.31 TYPE 2 DIABETES MELLITUS WITH STAGE 3A CHRONIC KIDNEY DISEASE, WITHOUT LONG-TERM CURRENT USE OF INSULIN: Chronic | ICD-10-CM

## 2024-04-04 DIAGNOSIS — E78.5 HYPERLIPIDEMIA, UNSPECIFIED HYPERLIPIDEMIA TYPE: ICD-10-CM

## 2024-04-04 DIAGNOSIS — Z78.0 POSTMENOPAUSAL: Primary | ICD-10-CM

## 2024-04-04 NOTE — Clinical Note
Please schedule for Nurse visit and for Wellness/FU.  Patient request July 2024 scheduling not able to do so before that time. She has a morning appt. Preference. Labs have been ordered.

## 2024-04-04 NOTE — PROGRESS NOTES
Health Maintenance Topic(s) Outreach Outcomes & Actions Taken:  Spoke with the patient, states she would like to follow up with Dr. Martins, but she will not want to schedule until July 2024  I will coordinate her lab work,nurse visit, DrWilliam Visit, mammogram and Dexa screen. As per her request all to be done in July 2024.  Requesting labs at Dr. Martins's office with morning appt. preference. Mammogram and Dexa at [facility]. Morning preference.  The patient was informed her Mammogram is no longer a part of her Wellness and she will be responsible for any cost incurred. She verbalized understanding, last Mammogram on file 2021.  I will message PCP to place orders in [facility] for mammogram as I am not authorized to order (it  is not a Wellness screening). I will order the Dexa and labs and coordinate her appointments.      Osteoporosis Screening - Outreach Outcomes & Actions Taken  : Dexa Order Placed and   Will fax to [facility]     Lab(s) - Outreach Outcomes & Actions Taken  : Overdue Lab(s) Ordered and    Patient request Nurse visit at PCP clinic for lab draw.    Blood Pressure - Outreach Outcomes & Actions Taken  :   States she does not have her BP cuff with her she will go to [facility] and call back with a check. She is not eligible for Digital Medicine.     Primary Care Appt - Outreach Outcomes & Actions Taken  :   Message to PCP staff pool to schedule follow up and [place] in July     Additional Notes:  Patient is out of town and will be back in July she requested all appointments to be done at that time. I am coordinating and will notify the patient once all is done.  Follow up call BP taken 116/71       Care Management, Digital Medicine, and/or Education Referrals      Not eligible will have remote BP to be taken at [facility] she will notify me once she gets it done.

## 2024-04-08 ENCOUNTER — TELEPHONE (OUTPATIENT)
Dept: ADMINISTRATIVE | Facility: HOSPITAL | Age: 79
End: 2024-04-08
Payer: MEDICARE

## 2024-04-08 DIAGNOSIS — Z12.39 ENCOUNTER FOR SCREENING FOR MALIGNANT NEOPLASM OF BREAST, UNSPECIFIED SCREENING MODALITY: Primary | ICD-10-CM

## 2024-04-08 DIAGNOSIS — Z12.31 ENCOUNTER FOR SCREENING MAMMOGRAM FOR MALIGNANT NEOPLASM OF BREAST: ICD-10-CM

## 2024-04-18 VITALS — DIASTOLIC BLOOD PRESSURE: 71 MMHG | SYSTOLIC BLOOD PRESSURE: 116 MMHG

## 2024-05-17 DIAGNOSIS — K21.9 GASTROESOPHAGEAL REFLUX DISEASE, UNSPECIFIED WHETHER ESOPHAGITIS PRESENT: Primary | ICD-10-CM

## 2024-05-20 RX ORDER — OMEPRAZOLE 40 MG/1
40 CAPSULE, DELAYED RELEASE ORAL EVERY MORNING
Qty: 90 CAPSULE | Refills: 1 | Status: SHIPPED | OUTPATIENT
Start: 2024-05-20

## 2024-05-30 ENCOUNTER — TELEPHONE (OUTPATIENT)
Dept: PRIMARY CARE CLINIC | Facility: CLINIC | Age: 79
End: 2024-05-30
Payer: MEDICARE

## 2024-05-30 DIAGNOSIS — E11.65 TYPE 2 DIABETES MELLITUS WITH HYPERGLYCEMIA, WITHOUT LONG-TERM CURRENT USE OF INSULIN: ICD-10-CM

## 2024-05-30 DIAGNOSIS — E78.5 DYSLIPIDEMIA: Chronic | ICD-10-CM

## 2024-05-30 RX ORDER — METFORMIN HYDROCHLORIDE 850 MG/1
850 TABLET ORAL 2 TIMES DAILY
Qty: 180 TABLET | Refills: 3 | Status: SHIPPED | OUTPATIENT
Start: 2024-05-30 | End: 2025-05-30

## 2024-05-30 RX ORDER — GLIPIZIDE 10 MG/1
10 TABLET, FILM COATED, EXTENDED RELEASE ORAL
Qty: 90 TABLET | Refills: 3 | Status: SHIPPED | OUTPATIENT
Start: 2024-05-30 | End: 2025-05-25

## 2024-05-30 RX ORDER — RIVASTIGMINE TARTRATE 6 MG/1
6 CAPSULE ORAL 2 TIMES DAILY
Qty: 180 CAPSULE | Refills: 3 | Status: SHIPPED | OUTPATIENT
Start: 2024-05-30

## 2024-05-30 NOTE — TELEPHONE ENCOUNTER
----- Message from Zach Hale sent at 5/30/2024  1:41 PM CDT -----  Type:  RX Refill Request    Who Called: pt  Refill or New Rx:refill    RX Name and Strength:rivastigmine tartrate (EXELON) 6 mg capsule  RX Name and Strength:glipiZIDE (GLUCOTROL) 10 MG TR24  RX Name and Strength:metFORMIN (GLUCOPHAGE) 850 MG tablet    Preferred Pharmacy with phone number:[facility] - [facility] [address]      Local or Mail Order:  Ordering Provider:  Would the patient rather a call back or a response via [facility]?   Best Call Back Number  Additional Information: pt completely out

## 2024-06-03 ENCOUNTER — TELEPHONE (OUTPATIENT)
Dept: PRIMARY CARE CLINIC | Facility: CLINIC | Age: 79
End: 2024-06-03
Payer: MEDICARE

## 2024-06-03 NOTE — TELEPHONE ENCOUNTER
----- Message from Ila Polk sent at 6/3/2024  9:48 AM CDT -----  Regarding: prior auth  .Type:  RX Refill Request    Who Called: pt  Refill or New Rx:refill  RX Name and Strength:rivastigmine tartrate (EXELON) 6 mg capsule  How is the patient currently taking it? (ex. 1XDay): Take 1 capsule (6 mg total) by mouth 2 (two) times daily. - O  Is this a 30 day or 90 day RX:  Preferred Pharmacy with phone number:[facility] - [place], [place] - [address]      Local or Mail Order:mail  Ordering Provider:  Would the patient rather a call back or a response via [facility]? no  Best Call Back Number:  Additional Information:

## 2024-07-03 ENCOUNTER — CLINICAL SUPPORT (OUTPATIENT)
Dept: PRIMARY CARE CLINIC | Facility: CLINIC | Age: 79
End: 2024-07-03
Payer: MEDICARE

## 2024-07-03 DIAGNOSIS — E78.5 DYSLIPIDEMIA: ICD-10-CM

## 2024-07-03 DIAGNOSIS — E11.22 TYPE 2 DIABETES MELLITUS WITH STAGE 3A CHRONIC KIDNEY DISEASE, WITHOUT LONG-TERM CURRENT USE OF INSULIN: Chronic | ICD-10-CM

## 2024-07-03 DIAGNOSIS — Z13.220 SCREENING CHOLESTEROL LEVEL: ICD-10-CM

## 2024-07-03 DIAGNOSIS — E78.5 HYPERLIPIDEMIA, UNSPECIFIED HYPERLIPIDEMIA TYPE: ICD-10-CM

## 2024-07-03 DIAGNOSIS — N18.31 TYPE 2 DIABETES MELLITUS WITH STAGE 3A CHRONIC KIDNEY DISEASE, WITHOUT LONG-TERM CURRENT USE OF INSULIN: Primary | ICD-10-CM

## 2024-07-03 DIAGNOSIS — N18.31 TYPE 2 DIABETES MELLITUS WITH STAGE 3A CHRONIC KIDNEY DISEASE, WITHOUT LONG-TERM CURRENT USE OF INSULIN: Chronic | ICD-10-CM

## 2024-07-03 DIAGNOSIS — E11.22 TYPE 2 DIABETES MELLITUS WITH STAGE 3A CHRONIC KIDNEY DISEASE, WITHOUT LONG-TERM CURRENT USE OF INSULIN: Primary | ICD-10-CM

## 2024-07-03 LAB
CHOLEST SERPL-MCNC: 183 MG/DL
CHOLEST/HDLC SERPL: 5 {RATIO} (ref 0–5)
CREAT UR-MCNC: 129.2 MG/DL (ref 45–106)
EST. AVERAGE GLUCOSE BLD GHB EST-MCNC: 168.6 MG/DL
HBA1C MFR BLD: 7.5 %
HDLC SERPL-MCNC: 39 MG/DL (ref 35–60)
LDLC SERPL CALC-MCNC: 126 MG/DL (ref 50–140)
MICROALBUMIN UR-MCNC: 16 UG/ML
MICROALBUMIN/CREAT RATIO PNL UR: 12.4 MG/GM CR (ref 0–30)
TRIGL SERPL-MCNC: 88 MG/DL (ref 37–140)
VLDLC SERPL CALC-MCNC: 18 MG/DL

## 2024-07-03 PROCEDURE — 36415 COLL VENOUS BLD VENIPUNCTURE: CPT

## 2024-07-03 PROCEDURE — 36415 COLL VENOUS BLD VENIPUNCTURE: CPT | Mod: ,,, | Performed by: GENERAL PRACTICE

## 2024-07-03 PROCEDURE — 80061 LIPID PANEL: CPT | Performed by: GENERAL PRACTICE

## 2024-07-03 PROCEDURE — 82570 ASSAY OF URINE CREATININE: CPT | Performed by: GENERAL PRACTICE

## 2024-07-03 PROCEDURE — 83036 HEMOGLOBIN GLYCOSYLATED A1C: CPT | Performed by: GENERAL PRACTICE

## 2024-07-23 DIAGNOSIS — I10 PRIMARY HYPERTENSION: Chronic | ICD-10-CM

## 2024-07-23 DIAGNOSIS — E55.9 VITAMIN D DEFICIENCY: Primary | ICD-10-CM

## 2024-07-23 RX ORDER — ASCORBIC ACID 125 MG
25 TABLET,CHEWABLE ORAL DAILY
Qty: 90 TABLET | Refills: 3 | Status: SHIPPED | OUTPATIENT
Start: 2024-07-23 | End: 2025-07-23

## 2024-07-23 RX ORDER — AMLODIPINE BESYLATE 5 MG/1
5 TABLET ORAL DAILY
Qty: 90 TABLET | Refills: 3 | Status: SHIPPED | OUTPATIENT
Start: 2024-07-23 | End: 2025-07-23

## 2024-07-26 LAB
LEFT EYE DM RETINOPATHY: NEGATIVE
RIGHT EYE DM RETINOPATHY: NEGATIVE

## 2024-07-29 ENCOUNTER — DOCUMENTATION ONLY (OUTPATIENT)
Dept: PRIMARY CARE CLINIC | Facility: CLINIC | Age: 79
End: 2024-07-29
Payer: MEDICARE

## 2024-07-31 LAB
BCS RECOMMENDATION EXT: NORMAL
BMD RECOMMENDATION EXT: NORMAL

## 2024-08-01 ENCOUNTER — DOCUMENTATION ONLY (OUTPATIENT)
Dept: PRIMARY CARE CLINIC | Facility: CLINIC | Age: 79
End: 2024-08-01
Payer: MEDICARE

## 2024-08-05 ENCOUNTER — DOCUMENTATION ONLY (OUTPATIENT)
Dept: PRIMARY CARE CLINIC | Facility: CLINIC | Age: 79
End: 2024-08-05
Payer: MEDICARE

## 2024-08-12 PROBLEM — M19.041 PRIMARY OSTEOARTHRITIS OF RIGHT HAND: Status: ACTIVE | Noted: 2022-07-24

## 2024-08-12 PROBLEM — M79.641 PAIN OF RIGHT HAND: Chronic | Status: ACTIVE | Noted: 2023-04-06

## 2024-08-13 ENCOUNTER — DOCUMENTATION ONLY (OUTPATIENT)
Dept: PRIMARY CARE CLINIC | Facility: CLINIC | Age: 79
End: 2024-08-13

## 2024-10-02 DIAGNOSIS — M65.4 DE QUERVAIN'S TENOSYNOVITIS, LEFT: Primary | ICD-10-CM

## 2024-10-16 DIAGNOSIS — N18.31 STAGE 3A CHRONIC KIDNEY DISEASE (CKD): Primary | ICD-10-CM

## 2024-10-16 RX ORDER — POTASSIUM CHLORIDE 20 MEQ/1
20 TABLET, EXTENDED RELEASE ORAL 2 TIMES DAILY
Qty: 90 TABLET | Refills: 3 | Status: SHIPPED | OUTPATIENT
Start: 2024-10-16

## 2024-10-16 RX ORDER — POTASSIUM CHLORIDE 20 MEQ/1
20 TABLET, EXTENDED RELEASE ORAL 2 TIMES DAILY
COMMUNITY
End: 2024-10-16 | Stop reason: SDUPTHER

## 2024-10-16 NOTE — TELEPHONE ENCOUNTER
----- Message from Yuliet sent at 10/16/2024  1:30 PM CDT -----  Regarding: refill  Who Called: Emily Oshea    Refill or New Rx:Refill  RX Name and Strength:potassium chloride SA (K-DUR,KLOR-CON) 20 MEQ tablet  How is the patient currently taking it? (ex. 1XDay):  Is this a 30 day or 90 day RX:  Local or Mail Order:    List of preferred pharmacies on file (remove unneeded): [facility]    If different Pharmacy is requested, enter Pharmacy information here including location and phone number:    Ordering Provider:      Preferred Method of Contact: Phone Call  Patient's Preferred Phone Number on File: [phone]   Best Call Back Number, if different:    Additional Information: stated that she is out of meds and is needing a refill. Please advise

## 2025-02-15 ENCOUNTER — HOSPITAL ENCOUNTER (EMERGENCY)
Facility: HOSPITAL | Age: 80
Discharge: HOME | End: 2025-02-15
Attending: EMERGENCY MEDICINE
Payer: MEDICARE

## 2025-02-15 VITALS
RESPIRATION RATE: 20 BRPM | DIASTOLIC BLOOD PRESSURE: 72 MMHG | TEMPERATURE: 99 F | WEIGHT: 210 LBS | HEART RATE: 83 BPM | HEIGHT: 61 IN | OXYGEN SATURATION: 100 % | SYSTOLIC BLOOD PRESSURE: 139 MMHG | BODY MASS INDEX: 39.65 KG/M2

## 2025-02-15 DIAGNOSIS — R41.82 AMS (ALTERED MENTAL STATUS): ICD-10-CM

## 2025-02-15 DIAGNOSIS — R41.3 MEMORY LOSS: Primary | ICD-10-CM

## 2025-02-15 LAB
ALBUMIN SERPL-MCNC: 3.9 G/DL (ref 3.4–4.8)
ALBUMIN/GLOB SERPL: 1.1 RATIO (ref 1.1–2)
ALP SERPL-CCNC: 83 UNIT/L (ref 40–150)
ALT SERPL-CCNC: 19 UNIT/L (ref 0–55)
ANION GAP SERPL CALC-SCNC: 10 MEQ/L
AST SERPL-CCNC: 23 UNIT/L (ref 5–34)
BACTERIA #/AREA URNS AUTO: ABNORMAL /HPF
BASOPHILS # BLD AUTO: 0.05 X10(3)/MCL
BASOPHILS NFR BLD AUTO: 0.6 %
BILIRUB SERPL-MCNC: 0.4 MG/DL
BILIRUB UR QL STRIP.AUTO: NEGATIVE
BUN SERPL-MCNC: 12.2 MG/DL (ref 9.8–20.1)
CALCIUM SERPL-MCNC: 10.4 MG/DL (ref 8.4–10.2)
CHLORIDE SERPL-SCNC: 103 MMOL/L (ref 98–107)
CLARITY UR: CLEAR
CO2 SERPL-SCNC: 25 MMOL/L (ref 23–31)
COLOR UR AUTO: ABNORMAL
CREAT SERPL-MCNC: 0.82 MG/DL (ref 0.55–1.02)
CREAT/UREA NIT SERPL: 15
EOSINOPHIL # BLD AUTO: 0.18 X10(3)/MCL (ref 0–0.9)
EOSINOPHIL NFR BLD AUTO: 2 %
ERYTHROCYTE [DISTWIDTH] IN BLOOD BY AUTOMATED COUNT: 12.1 % (ref 11.5–17)
GFR SERPLBLD CREATININE-BSD FMLA CKD-EPI: >60 ML/MIN/1.73/M2
GLOBULIN SER-MCNC: 3.6 GM/DL (ref 2.4–3.5)
GLUCOSE SERPL-MCNC: 164 MG/DL (ref 82–115)
GLUCOSE UR QL STRIP: NORMAL
HCT VFR BLD AUTO: 42.6 % (ref 37–47)
HGB BLD-MCNC: 13.8 G/DL (ref 12–16)
HGB UR QL STRIP: NEGATIVE
IMM GRANULOCYTES # BLD AUTO: 0.02 X10(3)/MCL (ref 0–0.04)
IMM GRANULOCYTES NFR BLD AUTO: 0.2 %
KETONES UR QL STRIP: NEGATIVE
LEUKOCYTE ESTERASE UR QL STRIP: 75
LYMPHOCYTES # BLD AUTO: 3.06 X10(3)/MCL (ref 0.6–4.6)
LYMPHOCYTES NFR BLD AUTO: 34.7 %
MCH RBC QN AUTO: 30.7 PG (ref 27–31)
MCHC RBC AUTO-ENTMCNC: 32.4 G/DL (ref 33–36)
MCV RBC AUTO: 94.7 FL (ref 80–94)
MONOCYTES # BLD AUTO: 0.58 X10(3)/MCL (ref 0.1–1.3)
MONOCYTES NFR BLD AUTO: 6.6 %
NEUTROPHILS # BLD AUTO: 4.92 X10(3)/MCL (ref 2.1–9.2)
NEUTROPHILS NFR BLD AUTO: 55.9 %
NITRITE UR QL STRIP: NEGATIVE
NRBC BLD AUTO-RTO: 0 %
PH UR STRIP: 8 [PH]
PLATELET # BLD AUTO: 276 X10(3)/MCL (ref 130–400)
PMV BLD AUTO: 10.3 FL (ref 7.4–10.4)
POTASSIUM SERPL-SCNC: 4.6 MMOL/L (ref 3.5–5.1)
PROT SERPL-MCNC: 7.5 GM/DL (ref 5.8–7.6)
PROT UR QL STRIP: ABNORMAL
RBC # BLD AUTO: 4.5 X10(6)/MCL (ref 4.2–5.4)
RBC #/AREA URNS AUTO: ABNORMAL /HPF
SODIUM SERPL-SCNC: 138 MMOL/L (ref 136–145)
SP GR UR STRIP.AUTO: 1.02 (ref 1–1.03)
SQUAMOUS #/AREA URNS LPF: ABNORMAL /HPF
TROPONIN I SERPL-MCNC: <0.01 NG/ML (ref 0–0.04)
UROBILINOGEN UR STRIP-ACNC: NORMAL
WBC # BLD AUTO: 8.81 X10(3)/MCL (ref 4.5–11.5)
WBC #/AREA URNS AUTO: ABNORMAL /HPF

## 2025-02-15 PROCEDURE — 99285 EMERGENCY DEPT VISIT HI MDM: CPT | Mod: 25

## 2025-02-15 PROCEDURE — 84484 ASSAY OF TROPONIN QUANT: CPT | Performed by: NURSE PRACTITIONER

## 2025-02-15 PROCEDURE — 81001 URINALYSIS AUTO W/SCOPE: CPT | Performed by: NURSE PRACTITIONER

## 2025-02-15 PROCEDURE — 85025 COMPLETE CBC W/AUTO DIFF WBC: CPT | Performed by: NURSE PRACTITIONER

## 2025-02-15 PROCEDURE — 93005 ELECTROCARDIOGRAM TRACING: CPT

## 2025-02-15 PROCEDURE — 80053 COMPREHEN METABOLIC PANEL: CPT | Performed by: NURSE PRACTITIONER

## 2025-02-15 NOTE — ED PROVIDER NOTES
Encounter Date: 2/15/2025    SCRIBE #1 NOTE: I, Mery Mi, am scribing for, and in the presence of,  Vega Clark MD. I have scribed the following portions of the note - the EKG reading. Other sections scribed: HPI, ROS, PE.       History     Chief Complaint   Patient presents with    Altered Mental Status     Per patient states having difficulty with numbers and writing out name x 1 month, also c/o lower back pain with radiation to right leg, denies bowel or bladder incontinence.     Patient is a 80 y/o female with a PMHx of arthritis, DM, and HTN presents to the ED for altered mental status beginning 5 days ago. Patient reports driving back from [place]. She reports having difficulty with numbers and decision-making. She reports having occasional headaches. She reports taking 30 minutes to cash in a check at her bank recently.    PCP: Dr. Martins  Cardiologist: Dr. Larson.    The history is provided by the patient and medical records. No  was used.     Review of patient's allergies indicates:   Allergen Reactions    Pregabalin      Other reaction(s): fatigue     Past Medical History:   Diagnosis Date    Arthritis     Diabetes mellitus     Hypertension      Past Surgical History:   Procedure Laterality Date    COLONOSCOPY  02/23/2021     No family history on file.  Social History[1]  Review of Systems   Constitutional:  Negative for chills and fever.   HENT:  Negative for congestion.    Respiratory:  Negative for shortness of breath.    Cardiovascular:  Negative for chest pain.   Gastrointestinal:  Negative for abdominal pain, nausea and vomiting.   Genitourinary:  Negative for dysuria.   Musculoskeletal:  Negative for back pain and neck pain.   Neurological:  Positive for headaches (occasional).   Psychiatric/Behavioral:  Positive for decreased concentration.        Physical Exam     Initial Vitals [02/15/25 1308]   BP Pulse Resp Temp SpO2   (!) 168/78 94 18 98.5 °F (36.9 °C) 97 %       MAP       --         Physical Exam    Nursing note and vitals reviewed.  Constitutional: She appears well-developed. She is Obese .   HENT:   Head: Normocephalic and atraumatic.   Right Ear: External ear normal.   Left Ear: External ear normal.   Eyes: Conjunctivae and EOM are normal. Pupils are equal, round, and reactive to light.   Neck: Neck supple.   Normal range of motion.  Cardiovascular:  Normal rate, regular rhythm, normal heart sounds and intact distal pulses.           Pulmonary/Chest: Breath sounds normal.   Abdominal: Abdomen is soft. Bowel sounds are normal.   Musculoskeletal:         General: Normal range of motion.      Cervical back: Normal range of motion and neck supple.     Neurological: She is alert and oriented to person, place, and time. GCS score is 15. GCS eye subscore is 4. GCS verbal subscore is 5. GCS motor subscore is 6.   Skin: Skin is warm and dry. Capillary refill takes less than 2 seconds.   Psychiatric: She has a normal mood and affect. Her behavior is normal. Judgment and thought content normal.         ED Course   Procedures  Labs Reviewed   COMPREHENSIVE METABOLIC PANEL - Abnormal       Result Value    Sodium 138      Potassium 4.6      Chloride 103      CO2 25      Glucose 164 (*)     Blood Urea Nitrogen 12.2      Creatinine 0.82      Calcium 10.4 (*)     Protein Total 7.5      Albumin 3.9      Globulin 3.6 (*)     Albumin/Globulin Ratio 1.1      Bilirubin Total 0.4      ALP 83      ALT 19      AST 23      eGFR >60      Anion Gap 10.0      BUN/Creatinine Ratio 15     URINALYSIS, REFLEX TO URINE CULTURE - Abnormal    Color, UA Light-Yellow      Appearance, UA Clear      Specific Gravity, UA 1.019      pH, UA 8.0      Protein, UA Trace (*)     Glucose, UA Normal      Ketones, UA Negative      Blood, UA Negative      Bilirubin, UA Negative      Urobilinogen, UA Normal      Nitrites, UA Negative      Leukocyte Esterase, UA 75 (*)     RBC, UA 0-5      WBC, UA 0-5      Bacteria, UA  None Seen      Squamous Epithelial Cells, UA Trace     CBC WITH DIFFERENTIAL - Abnormal    WBC 8.81      RBC 4.50      Hgb 13.8      Hct 42.6      MCV 94.7 (*)     MCH 30.7      MCHC 32.4 (*)     RDW 12.1      Platelet 276      MPV 10.3      Neut % 55.9      Lymph % 34.7      Mono % 6.6      Eos % 2.0      Basophil % 0.6      Imm Grans % 0.2      Neut # 4.92      Lymph # 3.06      Mono # 0.58      Eos # 0.18      Baso # 0.05      Imm Gran # 0.02      NRBC% 0.0     TROPONIN I - Normal    Troponin-I <0.010     CBC W/ AUTO DIFFERENTIAL    Narrative:     The following orders were created for panel order CBC Auto Differential.  Procedure                               Abnormality         Status                     ---------                               -----------         ------                     CBC with Differential[[id number]]       Abnormal            Final result                 Please view results for these tests on the individual orders.     EKG Readings: (Independently Interpreted)   Initial Reading: No STEMI. Rhythm: Normal Sinus Rhythm. Heart Rate: 93. Ectopy: No Ectopy. Conduction: Normal. ST Segments: Normal ST Segments. T Waves: Normal. Clinical Impression: Normal Sinus Rhythm   Done at 13:13 on 2/15/25       Imaging Results              CT Head Without Contrast (Final result)  Result time 02/15/25 13:49:52      Final result by Noemy Davila MD (02/15/25 13:49:52)                   Impression:      No appreciable acute intracranial abnormality.      Electronically signed by: Noemy Davila  Date:    02/15/2025  Time:    13:49               Narrative:    EXAMINATION:  CT HEAD WITHOUT CONTRAST    CLINICAL HISTORY:  Mental status change, unknown cause;    TECHNIQUE:  Low dose axial CT images obtained throughout the head without intravenous contrast.  Axial, sagittal and coronal reconstructions were performed and interpreted.    DLP: 999 mGycm    All CT scans at this location are performed using dose  optimization techniques as appropriate to a performed exam including the following automated exposure control, adjustment of the mA and/or kV according to patient size and/or use of iterative reconstruction technique    COMPARISON:  CT head 01/20/2013.  MRI brain 06/18/2013    FINDINGS:  BRAIN: Gray white differentiation is maintained. Mild periventricular and subcortical white matter changes most compatible with chronic small vessel ischemic disease.  No hemorrhage. No edema. No mass effect or midline shift.  The posterior fossa and midline structures are unremarkable.  There are atherosclerotic calcifications.    VENTRICLES: Normal in size and configuration.    EXTRA-AXIAL: No abnormal extra-axial collections.    BONES: Calvarium is intact.    SINUSES AND MASTOIDS: Visualized paranasal sinuses and mastoid air cells are clear.                                       Medications - No data to display  Medical Decision Making  The differential diagnosis includes, but is not limited to, early onset dementia, delirium, neoplasm, electrolyte disturbance, and medication side affect.     Amount and/or Complexity of Data Reviewed  Labs: ordered.  Radiology: ordered.  ECG/medicine tests: ordered and independent interpretation performed.     Details: No STEMI. Rhythm: Normal Sinus Rhythm. Heart Rate: 93. Ectopy: No Ectopy. Conduction: Normal. ST Segments: Normal ST Segments. T Waves: Normal. Clinical Impression: Normal Sinus Rhythm   Done at 13:13 on 2/15/25             Scribe Attestation:   Scribe #1: I performed the above scribed service and the documentation accurately describes the services I performed. I attest to the accuracy of the note.    Attending Attestation:           Physician Attestation for Scribe:  Physician Attestation Statement for Scribe #1: I, Vega Clark MD, reviewed documentation, as scribed by Mery Stark in my presence, and it is both accurate and complete.             ED Course as of 02/15/25  1600   Sat Feb 15, 2025   1547 CT unremarkable, as are labs.  Offered neurology referral.  Also offered initiating either Aricept or Namenda.  She reports that she is already on medication for her memory.  I sifted through her bags of medication and she was recently started on rivastigmine by her PCP.  Will continue this for now and refer to neurology. [CL]      ED Course User Index  [CL] Vega Clark MD                           Clinical Impression:  Final diagnoses:  [R41.82] AMS (altered mental status)  [R41.3] Memory loss (Primary)          ED Disposition Condition    Discharge Stable          ED Prescriptions    None       Follow-up Information       Follow up With Specialties Details Why Contact Info Additional Information    [facility] Schedule an appointment as soon as possible for a visit   [address] [place] [address], [address]  [facility] [address] [phone] [address]                 [1]   Social History  Tobacco Use    Smoking status: Never    Smokeless tobacco: Never        Vega Clark MD  02/15/25 1600

## 2025-02-15 NOTE — FIRST PROVIDER EVALUATION
Medical screening examination initiated.  I have conducted a focused provider triage encounter, findings are as follows:    Brief history of present illness:  78y/o F presents to the ED with c/o reports since 1/10 she has been altered more than normal. Report difficulty reading and writing.     There were no vitals filed for this visit.    Pertinent physical exam:  AAA x 3    Brief workup plan:  Labs/Imaging     Preliminary workup initiated; this workup will be continued and followed by the physician or advanced practice provider that is assigned to the patient when roomed.  
Never

## 2025-02-17 LAB
OHS QRS DURATION: 74 MS
OHS QTC CALCULATION: 437 MS

## 2025-02-26 ENCOUNTER — DOCUMENTATION ONLY (OUTPATIENT)
Dept: PRIMARY CARE CLINIC | Facility: CLINIC | Age: 80
End: 2025-02-26
Payer: MEDICARE